# Patient Record
Sex: FEMALE | Race: WHITE
[De-identification: names, ages, dates, MRNs, and addresses within clinical notes are randomized per-mention and may not be internally consistent; named-entity substitution may affect disease eponyms.]

---

## 2019-02-27 ENCOUNTER — HOSPITAL ENCOUNTER (EMERGENCY)
Dept: HOSPITAL 94 - ER | Age: 74
Discharge: HOME | End: 2019-02-27
Payer: MEDICARE

## 2019-02-27 VITALS — WEIGHT: 200.42 LBS | HEIGHT: 68 IN | BODY MASS INDEX: 30.38 KG/M2

## 2019-02-27 VITALS — DIASTOLIC BLOOD PRESSURE: 88 MMHG | SYSTOLIC BLOOD PRESSURE: 135 MMHG

## 2019-02-27 DIAGNOSIS — L03.311: Primary | ICD-10-CM

## 2019-02-27 DIAGNOSIS — Z88.8: ICD-10-CM

## 2019-02-27 DIAGNOSIS — G89.29: ICD-10-CM

## 2019-02-27 DIAGNOSIS — J44.9: ICD-10-CM

## 2019-02-27 DIAGNOSIS — M54.9: ICD-10-CM

## 2019-02-27 DIAGNOSIS — B35.4: ICD-10-CM

## 2019-02-27 DIAGNOSIS — F17.200: ICD-10-CM

## 2019-02-27 DIAGNOSIS — Z88.1: ICD-10-CM

## 2019-02-27 DIAGNOSIS — D72.829: ICD-10-CM

## 2019-02-27 LAB
ALBUMIN SERPL BCP-MCNC: 3.4 G/DL (ref 3.4–5)
ALBUMIN/GLOB SERPL: 0.8 {RATIO} (ref 1.1–1.5)
ALP SERPL-CCNC: 105 IU/L (ref 46–116)
ALT SERPL W P-5'-P-CCNC: 28 U/L (ref 12–78)
AMORPH URATE CRY #/AREA URNS HPF: (no result) /[HPF]
ANION GAP SERPL CALCULATED.3IONS-SCNC: 8 MMOL/L (ref 8–16)
AST SERPL W P-5'-P-CCNC: 24 U/L (ref 10–37)
BACTERIA URNS QL MICRO: (no result) /HPF
BASOPHILS # BLD AUTO: 0.1 X10'3 (ref 0–0.2)
BASOPHILS NFR BLD AUTO: 0.4 % (ref 0–1)
BILIRUB SERPL-MCNC: 0.6 MG/DL (ref 0.1–1)
BUN SERPL-MCNC: 20 MG/DL (ref 7–18)
BUN/CREAT SERPL: 20.8 (ref 6.6–38)
CALCIUM SERPL-MCNC: 9 MG/DL (ref 8.5–10.1)
CHLORIDE SERPL-SCNC: 100 MMOL/L (ref 99–107)
CLARITY UR: (no result)
CO2 SERPL-SCNC: 25.7 MMOL/L (ref 24–32)
COLOR UR: YELLOW
CREAT SERPL-MCNC: 0.96 MG/DL (ref 0.4–0.9)
DEPRECATED SQUAMOUS URNS QL MICRO: (no result) /LPF
EOSINOPHIL # BLD AUTO: 0 X10'3 (ref 0–0.9)
EOSINOPHIL NFR BLD AUTO: 0.1 % (ref 0–6)
ERYTHROCYTE [DISTWIDTH] IN BLOOD BY AUTOMATED COUNT: 13.3 % (ref 11.5–14.5)
GFR SERPL CREATININE-BSD FRML MDRD: 57 ML/MIN
GLUCOSE SERPL-MCNC: 107 MG/DL (ref 70–104)
GLUCOSE UR STRIP-MCNC: NEGATIVE MG/DL
HCT VFR BLD AUTO: 39 % (ref 35–45)
HGB BLD-MCNC: 12.9 G/DL (ref 12–16)
HGB UR QL STRIP: NEGATIVE
HYALINE CASTS URNS QL MICRO: (no result) /LPF
INR PPP: 1 INR
KETONES UR STRIP-MCNC: NEGATIVE MG/DL
LEUKOCYTE ESTERASE UR QL STRIP: NEGATIVE
LYMPHOCYTES # BLD AUTO: 0.8 X10'3 (ref 1.1–4.8)
LYMPHOCYTES NFR BLD AUTO: 3.8 % (ref 21–51)
LYMPHOCYTES NFR BLD MANUAL: 6 % (ref 21–51)
MCH RBC QN AUTO: 29.9 PG (ref 27–31)
MCHC RBC AUTO-ENTMCNC: 33.1 G/DL (ref 33–36.5)
MCV RBC AUTO: 90.4 FL (ref 78–98)
MONOCYTES # BLD AUTO: 1.7 X10'3 (ref 0–0.9)
MONOCYTES NFR BLD AUTO: 8.1 % (ref 2–12)
MONOCYTES NFR BLD MANUAL: 8 % (ref 2–12)
MUCOUS THREADS URNS QL MICRO: (no result) /LPF
NEUTROPHILS # BLD AUTO: 18.2 X10'3 (ref 1.8–7.7)
NEUTROPHILS NFR BLD AUTO: 87.6 % (ref 42–75)
NEUTS BAND # BLD MANUAL: 3 % (ref 0–10)
NEUTS SEG NFR BLD MANUAL: 83 % (ref 42–75)
NITRITE UR QL STRIP: NEGATIVE
PH UR STRIP: 5.5 [PH] (ref 4.8–8)
PLATELET # BLD AUTO: 233 X10'3 (ref 140–440)
PLATELET BLD QL SMEAR: NORMAL
PMV BLD AUTO: 8.1 FL (ref 7.4–10.4)
POTASSIUM SERPL-SCNC: 4 MMOL/L (ref 3.5–5.1)
PROT SERPL-MCNC: 7.6 G/DL (ref 6.4–8.2)
PROT UR QL STRIP: 30 MG/DL
PROTHROMBIN TIME: 9.7 SECONDS (ref 9–12)
RBC # BLD AUTO: 4.32 X10'6 (ref 4.2–5.6)
RBC #/AREA URNS HPF: (no result) /HPF (ref 0–2)
RBC MORPH BLD: NORMAL
SODIUM SERPL-SCNC: 134 MMOL/L (ref 135–145)
SP GR UR STRIP: 1.02 (ref 1–1.03)
TOTAL CELLS COUNTED FLD: 100
TRANS CELLS URNS QL MICRO: (no result) /HPF
URN COLLECT METHOD CLASS: (no result)
UROBILINOGEN UR STRIP-MCNC: 0.2 E.U/DL (ref 0.2–1)
WBC # BLD AUTO: 20.7 X10'3 (ref 4.5–11)
WBC #/AREA URNS HPF: (no result) /HPF (ref 0–4)

## 2019-02-27 PROCEDURE — 85610 PROTHROMBIN TIME: CPT

## 2019-02-27 PROCEDURE — 80053 COMPREHEN METABOLIC PANEL: CPT

## 2019-02-27 PROCEDURE — 85025 COMPLETE CBC W/AUTO DIFF WBC: CPT

## 2019-02-27 PROCEDURE — 83605 ASSAY OF LACTIC ACID: CPT

## 2019-02-27 PROCEDURE — 87040 BLOOD CULTURE FOR BACTERIA: CPT

## 2019-02-27 PROCEDURE — 71046 X-RAY EXAM CHEST 2 VIEWS: CPT

## 2019-02-27 PROCEDURE — 99284 EMERGENCY DEPT VISIT MOD MDM: CPT

## 2019-02-27 PROCEDURE — 93005 ELECTROCARDIOGRAM TRACING: CPT

## 2019-02-27 PROCEDURE — 81001 URINALYSIS AUTO W/SCOPE: CPT

## 2019-02-27 PROCEDURE — 36415 COLL VENOUS BLD VENIPUNCTURE: CPT

## 2019-02-27 PROCEDURE — 84145 PROCALCITONIN (PCT): CPT

## 2019-02-27 PROCEDURE — 96365 THER/PROPH/DIAG IV INF INIT: CPT

## 2019-02-27 NOTE — NUR
Patient was given Tylenol and was able to swallow x2 pills without difficulty. 
Patient then given ibuprofen at which she stated that she could take the x3 200 
mg tablets, patient was given tablets and water. Patient began to cough and 
spit out water. Stated, "Just went down the wrong tube." Patient up at 90 
degrees/ high fowlers. No respirator distress after administration. Patient 
oxygen staturation at 95%, no change since arrival in ED.

## 2019-04-15 NOTE — NUR
Patient's left neck IV infiltrated. Antibiotic and NS stopped. Patient denies 
any pain. swelling noted. RN Des attempting another IV with use of ultra 
sound. Patient ambulated to bedside comode, urine sample collected and sent to 
lab.

## 2019-04-16 NOTE — NUR
Patient in room . I have received report from JC rn   and had the opportunity to 
ask questions and assume patient care.

## 2019-04-16 NOTE — NUR
Patient in room . I have received report from JUSTINA May   and had the opportunity to 
ask questions and assume patient care.

## 2019-04-16 NOTE — NUR
PATIENT SETTLING INTO ROOM nORCO 5MG GIVEN WITH EFFECT FOR PAIN IN LEFT LEG. SLEEPING AND 
RESTING MOST OF SHIFT. UP TO BEDSIDE COMMODE WITH ASSIST X1. REPORT GIVEN TO ROOSEVELT HORN

## 2019-04-16 NOTE — NUR
Pt asleep. She had told me earlier not to awaken her if she did. So the zosyn 
was not scanned r/t her request and her left arm was under the covers. I 
quietly hung the zosyn and started it, without her waking up.

## 2019-04-17 NOTE — NUR
Patient in room . I have received report from   ravi brandt and had the opportunity 
to ask questions and assume patient care.

## 2019-04-17 NOTE — NUR
Problems reprioritized. Patient report given, questions answered & plan of care reviewed 
with JUSTINA May.

## 2019-04-17 NOTE — NUR
patient very upset at times with  having to be "constantly poked" Time spent with patient. 
patient refused to have second set of blood cultures taken. states she will wait till Te 
comes on at 2000hrs. Wick placed for incontinence. patient up using BSCX2. reluctant however 
to work with PT or get out of bed as states leg is very painful. Norco administered for 
pain. Report given to Cheryl BUTLER RN

## 2019-04-17 NOTE — NUR
Patient in room . I have received report from JUSTINA May   and had the opportunity to 
ask questions and assume patient care.

-------------------------------------------------------------------------------

Addendum: 04/17/19 at 1853 by Erica Moreno RN

-------------------------------------------------------------------------------

Amended: Links added.

## 2019-04-18 NOTE — NUR
Problems reprioritized. Patient report given, questions answered & plan of care reviewed 
with JUSTINA Choi. 

-------------------------------------------------------------------------------

Addendum: 04/18/19 at 0637 by Erica Moreno RN

-------------------------------------------------------------------------------

Amended: Links added.

## 2019-04-18 NOTE — NUR
Problems reprioritized. Patient report given, questions answered & plan of care reviewed 
with sherry osorio rn.

## 2019-04-18 NOTE — NUR
patient a/o, has lt. leg cellulitis, taking norco for her pain, no noted open areas and 
needs attended

-------------------------------------------------------------------------------

Addendum: 04/18/19 at 0549 by Erica Moreno RN

-------------------------------------------------------------------------------

Amended: Links added.

## 2019-04-18 NOTE — NUR
Extended PIV inserted to right upper arm basilic vein x 1 attempt after 1 failed attempt to 
the right upper arm cephalic vein both using ultrasound.  Ishmael well.

-------------------------------------------------------------------------------

Addendum: 04/18/19 at 1620 by Aurora Emerson RN

-------------------------------------------------------------------------------

Amended: Links added.

## 2019-04-19 NOTE — NUR
Problems reprioritized. Patient report given, questions answered & plan of care reviewed 
with JUSTINA Choi. 

-------------------------------------------------------------------------------

Addendum: 04/19/19 at 0631 by Erica Moreno RN

-------------------------------------------------------------------------------

Amended: Links added.

## 2019-04-19 NOTE — NUR
Patient in room . I have received report from JUSTINA Choi   and had the opportunity to 
ask questions and assume patient care.

-------------------------------------------------------------------------------

Addendum: 04/19/19 at 1819 by Erica Moreno RN

-------------------------------------------------------------------------------

Amended: Links added.

## 2019-04-19 NOTE — NUR
Initial: Pt admit with LLE cellulitis with possible sepsis. Pt currently 

on a regular diet with documented PO intake 100% meeting nutrient needs. 

[facility] 4/15. Per physical assessment pt is agitated and resistive to care. 

Will continue to follow and make recommendations as appropriate.

Recommendations:

1) Continue with regular diet

2) Monitor need for ONS or additional protein

3) Monitor need for additional bowel care

4) Wt per rx

-------------------------------------------------------------------------------

Addendum: 04/19/19 at 1229 by Esperanza Martinez RD

-------------------------------------------------------------------------------

Amended: Links added.

## 2019-04-20 NOTE — NUR
Patient in room . I have received report from  Lala HORN  and had the opportunity to 
ask questions and assume patient care.

## 2019-04-20 NOTE — NUR
pt complaining that her pain medications always given late, explained to her that her pain 
meds are not scheduled and she needs to call or ask for it if she needs it. pt still upset 
because she fell asleep last night and did not get her pain med after 4 hours because she 
was asleep

-------------------------------------------------------------------------------

Addendum: 04/20/19 at 0546 by Erica Moreno RN

-------------------------------------------------------------------------------

Amended: Links added.

## 2019-04-20 NOTE — NUR
Problems reprioritized. Patient report given, questions answered & plan of care reviewed 
with JUSTINA Schroeder. 

-------------------------------------------------------------------------------

Addendum: 04/20/19 at 0634 by Erica Moreno RN

-------------------------------------------------------------------------------

Amended: Links added.

## 2019-04-20 NOTE — NUR
Problems reprioritized. Patient report given, questions answered & plan of care reviewed 
with SHIRA HORN. PT SLEEPING COMFORTABLY, EVEN RISE AND FALL OF CHEST

## 2019-04-21 NOTE — NUR
Problems reprioritized. Patient report given, questions answered & plan of care reviewed 
with JUSTINA Boss.

## 2019-04-21 NOTE — NUR
Patient in room . I have received report from JUSTINA Blankenship   and had the opportunity 
to ask questions and assume patient care.

-------------------------------------------------------------------------------

Addendum: 04/21/19 at 1915 by Karyn Oconnor RN

-------------------------------------------------------------------------------

Amended: Links added.

## 2019-04-21 NOTE — NUR
Pt agreeable to taking prune juice. 1 cup of prune juice provided and consumed by pt. Will 
continue to monitor.

## 2019-04-21 NOTE — NUR
Patient in room . I have received report from  JUSTINA Chaudhary  and had the opportunity to 
ask questions and assume patient care.

## 2019-04-21 NOTE — NUR
No BM since PTA. Pt states "I refuse to take a laxative. You guys take as long as you do to 
get down here, and I'm not shitting the bed". Will notify MD and continue to monitor.

## 2019-04-21 NOTE — NUR
Problems reprioritized. Patient report given, questions answered & plan of care reviewed 
with Pattie HORN.

## 2019-04-22 NOTE — NUR
Problems reprioritized. Patient report given, questions answered & plan of care reviewed 
with JUSTINA Mejía. 

-------------------------------------------------------------------------------

Addendum: 04/22/19 at 0623 by Karyn Oconnor RN

-------------------------------------------------------------------------------

Amended: Links added.

## 2019-04-23 NOTE — NUR
Problems reprioritized. Patient report given, questions answered & plan of care reviewed 
with JUSTINA Rogers. 

-------------------------------------------------------------------------------

Addendum: 04/23/19 at 0619 by Karyn Oconnro RN

-------------------------------------------------------------------------------

Amended: Links added.

## 2019-04-23 NOTE — NUR
Patient in room . I have received report from JUSTINA Boss and had the opportunity to 
ask questions and assume patient care.

## 2019-04-23 NOTE — NUR
Reassessment: Pt continues with abx tx for cellulitis. Per MD notes pt  blood cultures 
positive for Staph Epi. Pt remains on regular diet with documented PO intake % still 
meeting nutrient needs. [facility] 4/22. Will continue to follow.

Recommendations:

1) Continue with regular diet

2) Monitor need for ONS or additional protein

3) Monitor need for additional bowel care

4) Wt per rx

-------------------------------------------------------------------------------

Addendum: 04/23/19 at 1110 by Esperanza Martinez RD

-------------------------------------------------------------------------------

Amended: Links added.

## 2019-04-23 NOTE — NUR
Problems reprioritized. Patient report given, questions answered & plan of care reviewed 
with Cheryl TONY RN.

## 2019-04-24 NOTE — NUR
EXTENDED IV DC, CANULA INTACT. ALL BELONGINGS IN HAND INCLUDING RX FOR NORCO DELIVERED BY 
[facility] BEDSIDE. FOLLOW UP INSTRUCTIONS GIVEN, ALL QUESTIONS ANSWERED.

## 2019-04-24 NOTE — NUR
Problems reprioritized. Patient report given, questions answered & plan of care reviewed 
with JUSTINA Browning.

## 2019-04-24 NOTE — NUR
PT DISCHARGED IN STABLE CONDITION. PT WAS WAITING FOR CAB RIDE, I WANTED TO LEAVE PT AT 
 IN LOBBY TO WAIT, SHE INSISTED THAT SHE WAIT OUTSIDE. I SUGGESTED SHE JUST WAIT 
INSIDE BECAUSE IT WAS WARM OUTSIDE. SHE STATED THAT SHE WAS SICK OF US MICROMANAGING HER, 
AND SHE INSISTED THAT SHE WAIT OUTSIDE. LEFT PT ON BENCH IN FRONT OF HOSPITAL TO WAIT FOR 
CAB.

## 2019-09-05 NOTE — NUR
Admission note:

   Pt admitted at 1220 from [facility]  on 5150 for DTS. Pt has a plan to break a bottle to 
cut her wrists or overdose on pills. [facility] 
out. PT feels depressed, hopeless, helpless and suicidal. Pt unable to formulate a  safety 
plan at this time. Pt has history of arthritis,, smoker, depression. Pt states her social 
security recently stopped and  has been trying to fix it. She reports prior suicide 
attempts. Pt states "Im at the end of anything. Month after month, day after day. Im tired 
of thinking about new ways to kill myself everyday. I have nowhere to go and nothing to do 
and cant live any longer."

## 2019-09-05 NOTE — NUR
Nursing Progress Note:



Legal hold:



Client on 5150 for DTS



Why are they here: Pt is here because she has no will to live. She has been at the [facility] 
and got kicked out for 30 days and now she wants to die.







Assessment



What has happened this shift:

After admission, pt ate lunch and attended afternoon group, then returned to her room and 
slept.



S/I, H/I: Yes, S.I.

A/VH: denies

Sleep: nap

ADL's: encourage, minimal assistance

Group attendance: yes

Were meds taken: n/a

Any med S/E





Mental Status Exam



Appearance: Disheveled

Eye contact:good

Behavior:cooperative, negative

Speech: clear

Mood: depressed, apathetic

Affect:expressive

Thought process: intact

Thought Content: perseverating on her life not worth living

Cognition:intact

Insight:poor

Judgment:poor







Interventions



PRN's used: n/a

Therapeutic interventions: positive reinforcement, milieu push, 1:1 

Restraints/seclusion/emergency medication: n/a





Justification of Continued Inpatient Treatment: pt remains DTS

## 2019-09-05 NOTE — NUR
Pt admitted from [facility] on a 5150 for DTS at 1220. Pt has been homeless and staying 
at the [place]. Pt came in stating, "I don't know why you guys just don't euthanize me. Why 
are you wasting so much money on a disabled, old person like me." Pt is hopeless and 
helpless. She is very expressive about this. "What's the point?" 2 Person skin check 
complete and all belongings logged in. Pt showered with staff assistance and clothes were 
washed. Pt oriented to the unit.

## 2019-09-06 NOTE — NUR
Nursing Progress Note:



Legal hold:  Client on 5150 for DTS



Why are they here: Pt is here because she has no will to live. She has been at the [place] 
and got kicked out for 30 days and now she wants to die.







Assessment



What has happened this shift:  Patient isolated to her room the entirety of the shift. She 
sat up in bed reading. Pt states she is not feeling suicidal right now but she is very upset 
that her MRSA culture came back positive. She states, "now where will take me? no where will 
want someone MRSA positive." Writer educates pt on what MRSA is and hand washing regimen. PT 
calms down after this and states that she will make sure that if she gets any cuts or 
scratches that she will be sure to wash them with soap and water and keep them clean. Pt 
refuses her HS amlodipine because "I have never taken that and I need to speak with a doctor 
face to face before I do please." Pt requests hydroxyzine 25mg PRN, which was given to her.  




S/I, H/I: "not right now" 

A/VH: denies

Sleep: see sleep assessment notation .

ADL's: encourage, minimal assistance

Group attendance: night shift, no group 

Were meds taken: n/a

Any med S/E: none reported, none observed 





Mental Status Exam



Appearance: Disheveled

Eye contact: good

Behavior: cooperative, negative

Speech: clear, loud.

Mood: depressed, apathetic

Affect: expressive

Thought process: Linear. Poverty of thought.

Thought Content: preoccupied about MRSA 

Cognition: intact

Insight: poor

Judgment: poor







Interventions



PRN's used: hydroxyzine 

Therapeutic interventions: 1:1, therapeutic conversation, clear and simple instructions, 
positive reinforcement, q15 safety checks.

Restraints/seclusion/emergency medication: n/a





Justification of Continued Inpatient Treatment: Pt. Is gravely disabled and cannot provide 
food, clothing and shelter. Patient is in need of crisis stabilization.

## 2019-09-06 NOTE — NUR
[facility] COLLATER[facility]:



UZAIR made TC to [facility] Charmaine (Director) [phone], who reports pt cannot return to [facility] 
because she refused to follow medication guidelines. [facility] reports he will look into the 
storage of pt items and return contact to SW once he knows if items are still in storage.



Erica Prakash,  [facility]

[id number]

Supervised by Rome Merino, [id number]

-------------------------------------------------------------------------------

Addendum: 09/06/19 at 2028 by Erica Prakash SS

-------------------------------------------------------------------------------

Addition:



UZAIR met w/  staff who provided SW w/ pt's stored items. UZAIR placed items in pt 
storage area w/ name tag attached to bag.

## 2019-09-06 NOTE — NUR
Nursing Progress Note:



Legal hold:



Client on 5150 for DTS



Why are they here: Pt is here because she has no will to live. She has been at the [place] 
and got kicked out for 30 days and now she wants to die.







Assessment



What has happened this shift:



Patient laying in bed at the beginning of shift. Patient was moved into a new bedroom and 
appears to be adjusting well. She is considerate of roommate and initiates conversation. 
Patient states she is feeling better but endorses depression. She denies SI, HI, AH, and VH. 
Patient expressed her sadness for being an elderly homeless woman that is not , and 
no children or grandchildren. Patient explains personal timeline out of order, jumping from 
present to past. She consistently emphasizes ETOH abuse did not help her situation. 



Patient explained a situation where a previous county "dumped" her and later explained how 
her and a friend got onto a bus because he knew family they could stay with. Patient also 
endorsed previous amphetamine use in which she went on a tangent that some may make it out 
ok but it "eats up" most people and continued to explain an invisible realm you can only see 
when using. 



Patient remained in her room most of the shift, reading a book. Pleasant and cooperative 
with assessment and able to make needs known.  



S/I, H/I: denies

A/VH: denies

Sleep: asleep at this time

ADL's: encourage, minimal assistance

Group attendance: no groups this shift

Were meds taken: none scheduled but PRN taken

Any med S/E: none observed, none reported.





Mental Status Exam



Appearance: Disheveled

Eye contact: good

Behavior: cooperative

Speech: clear

Mood: depressed, apathetic

Affect:expressive

Thought process: tangential, delusional

Thought Content: perseverating on being homeless with no spouse, children, or grandchildren

Cognition:intact

Insight:poor

Judgment:poor







Interventions



PRN's used: Motrin effective



Therapeutic interventions: positive reinforcement, milieu push, 1:1 

Restraints/seclusion/emergency medication: n/a





Justification of Continued Inpatient Treatment: pt remains DTS

## 2019-09-06 NOTE — NUR
Nursing Progress Note:



Legal hold:  Client on 5150 for DTS



Why are they here: Pt is here because she has no will to live. She has been at the [place] 
and got kicked out for 30 days and now she wants to die.







Assessment



What has happened this shift:  Patient sleeping most of the day.  Up for all meals. C/O 
aching pain all over, especially arms. Takes Tylenol for pain..  Patient perseverates on 
negatives. Educated client on MRSA and need for good hand washing. Pt. Upset that she cannot 
use baby wipes or alcohol on hands, and soap cracks her skin.



S/I, H/I: What, are you going to kick me out if I say no. Patient was informed that she has 
no housing and she will not be kicked out.

A/VH: denies

Sleep: 7.5 hrs. NOC, napped during daytime.

ADL's: encourage, minimal assistance

Group attendance: yes

Were meds taken: n/a

Any med S/E





Mental Status Exam



Appearance: Disheveled

Eye contact: good

Behavior: cooperative, negative

Speech: clear, loud.

Mood: depressed, apathetic

Affect: expressive

Thought process: Linear. Poverty of thought.

Thought Content: Wanting general assistance paperwork filled out.

Cognition: intact

Insight: poor

Judgment: poor







Interventions



PRN's used: n/a

Therapeutic interventions: 1:1, therapeutic conversation, clear and simple instructions, 
positive reinforcement, q15 safety checks.

Restraints/seclusion/emergency medication: n/a





Justification of Continued Inpatient Treatment: Pt. Is gravely disabled and cannot provide 
food, clothing and shelter. Patient is in need of crisis stabilization.

## 2019-09-07 NOTE — NUR
Nursing Progress Note:



Legal hold:  Client on 5150 for DTS





Why are they here: Pt is here because she has no will to live. She has been at the [place] 
and got kicked out for 30 days and now she wants to die.



What happened this shift?: Patient awakened for breakfast and medications.  Pt. initially 
refused BP medication and insisted on seeing the  before she would take her 
med.  Knocked on SW door and no one was there, informed her that I couldn't find SW yet. She 
agreed to take Amlodipine.  Pt. napped in afternoon.  





Assessment



S/I, H/I: Denies.

A/VH: denies

Sleep: Slept well NOC, napped during daytime.

ADL's: encourage, minimal assistance

Group attendance: yes

Were meds taken: Yes

Any med S/E





Mental Status Exam



Appearance: Disheveled

Eye contact: good

Behavior: cooperative, negative

Speech: clear, loud.

Mood: depressed, apathetic

Affect: expressive

Thought process: Linear. Poverty of thought.

Thought Content: Wanting general assistance paperwork filled out.

Cognition: intact

Insight: poor

Judgment: poor







Interventions



PRN's used: Tylenol



Therapeutic interventions: 1:1, therapeutic conversation, clear and simple instructions, 
positive reinforcement, q15 safety checks.



Restraints/seclusion/emergency medication: n/a





Justification of Continued Inpatient Treatment: Pt. Is gravely disabled and cannot provide 
food, clothing and shelter. Patient is in need of crisis stabilization.

## 2019-09-07 NOTE — NUR
Refused med:

  Pts /92 . Encouraged pt to take her new order of Norvasc. Pt refusing stating "I 
dont want to go to sleep. I need to stay awake for the ." After multiple 
attempts, pt continues to refuse it.

## 2019-09-08 NOTE — NUR
Nursing Progress Note:



Legal hold:  Client on 5150 for DTS



Why are they here: Pt is here because she has no will to live. She has been at the [facility] 
and got kicked out for 30 days and now she wants to die.







Assessment



What has happened this shift:  Patient isolated to her room at the beginning of the shift. 
She requests L depends which were given to her. She reports she has only been given XL 
depends which is causing leakage and she has to change her pants often. After she changed 
into the L size she reports it is much more comfortable. She goes into the group room for 
snack and stays in the room to eat and watch TV. Pt is talkative and tells stories to 
writer. She is cooperative with 1:1 assessment.  Pt requests hydroxyzine 25mg PRN and 
tylenol for arthritis pain, which was given to her.  



S/I, H/I: "not right now" 

A/VH: denies

Sleep: see sleep assessment notation .

ADL's: encourage, minimal assistance

Group attendance: night shift, no group 

Were meds taken: yes

Any med S/E: none reported, none observed 





Mental Status Exam



Appearance: Disheveled

Eye contact: good

Behavior: cooperative, negative

Speech: clear, loud.

Mood: fair

Affect: bland 

Thought process: Linear

Thought Content: WNL

Cognition: intact

Insight: poor

Judgment: poor







Interventions



PRN's used: hydroxyzine 

Therapeutic interventions: 1:1, therapeutic conversation, clear and simple instructions, 
positive reinforcement, q15 safety checks.

Restraints/seclusion/emergency medication: n/a





Justification of Continued Inpatient Treatment: Pt. Is gravely disabled and cannot provide 
food, clothing and shelter. Patient is in need of crisis stabilization.

## 2019-09-08 NOTE — NUR
Nursing Progress Note:



Legal hold:  N/A, pt is now on voluntary status



SBAR from Mar Roper RN



Why are they here: Pt is here because she has no will to live. She has been at the [facility] 
and got kicked out for 30 days and now she wants to die.







Assessment



What has happened this shift:  Pt states she is depressed about her life and if she had a 
way to end it, she would. Pt states that there is no point in going on. Pt contracts for 
safety here, states she won't try anything here, "that'd be stupid." Pt believes she has had 
a BM since 9/3/19 (the last one documented in [facility]) but can't remember when. Pt became 
irritable when offered MOM, stated that she is fine, she doesn't go everyday, states that 
she is not uncomfortable or in any pain, states that she will get up and exercise later and 
that should help. Pt c/o feeling sleepy after breakfast, stated she thinks it's from the 
blood pressure med, amlodipine then states or maybe it's because she ate too much. Pt sleeps 
with stuffed animal (dog) under her arm. Pt has 3+ pitting edema left lower leg from knee 
down. Pt states it has been this way since she had cellulitis 3 months ago, hospitalist 
aware, no redness noted, not warm to touch.



S/I, H/I: Pt denies HI, + SI; no plan.

A/VH: Pt denies

Sleep: Slept 7 hours per noc shift report, napped after breakfast.

ADL's: encourage, minimal assistance

Group attendance: No 

Were meds taken: yes

Any med S/E: reported feeling tired from amlodipine





Mental Status Exam



Appearance: Disheveled

Eye contact: Good

Behavior: Cooperative, stays in room for most of shift, out for meals

Speech: Clear, audible

Mood: Depressed, irritable

Affect: congruent

Thought process: Linear

Thought Content: thinks BP med makes her sleepy, does not wish to be pestered about 
constipation

Cognition: A/O X 4

Insight: poor

Judgment: poor







Interventions



PRN's used: Tylenol 975 mg at 0734 for bilateral shoulder pain



Therapeutic interventions: 1:1 assessment, therapeutic conversation, medication 
administration/education/monitoring, encouragement to perform personal hygiene, 
encouragement to attend groups, Q 15 min safety checks.

Restraints/seclusion/emergency medication: N/A





Justification of Continued Inpatient Treatment: Pt is gravely disabled and cannot provide 
food, clothing and shelter. Patient is in need of crisis stabilization and medication 
adjustment.

## 2019-09-09 NOTE — NUR
Nursing Progress Note:



Legal hold:  N/A, pt is now on voluntary status



SBAR from Tyler HORN



Why are they here: Pt is here because she has no will to live. She has been at the [facility] 
and got kicked out for 30 days and now she wants to die.







Assessment



What has happened this shift:  PT is visible on the unit, sitting in the community room. She 
also is observed reading a book in her room. She is upset that her disability has been 
stopped and says that tomorrow she might be able to talk to someone about it. She says her 
disability never should have been stopped. She reports her shoulders are still in pain and 
she utilizes the PRN tylenol. She also takes her HS amlodipine but refuses her colace. 

Pt's LLE is still +3, but pt reports it is "feeling much better, I am able to move it more 
than I was before, it's not as tight." 



S/I, H/I: Pt denies

A/VH: Pt denies

Sleep: See sleep assessment notations 

ADL's: encourage, minimal assistance

Group attendance: Night shift no group

Were meds taken:  denied colace 

Any med S/E:none reported or observed this shift. 





Mental Status Exam



Appearance: Disheveled

Eye contact: Good

Behavior: Cooperative, visible on unit 

Speech: Clear, audible

Mood: Depressed, worried

Affect:depressed 

Thought process: Linear

Thought Content: thinks BP med makes her sleepy, memory issues 

Cognition: A/O X 4

Insight: poor

Judgment: poor







Interventions



PRN's used: MOM



Therapeutic interventions: 1:1 assessment, therapeutic conversation, medication 
administration/education/monitoring, encouragement to perform personal hygiene, 
encouragement to attend groups, Q 15 min safety checks.

Restraints/seclusion/emergency medication: N/A





Justification of Continued Inpatient Treatment: Pt is gravely disabled and cannot provide 
food, clothing and shelter. Patient is in need of crisis stabilization and medication 
adjustment.

## 2019-09-09 NOTE — NUR
Nursing Progress Note:



Legal hold:  N/A, pt is now on voluntary status



SBAR from Tyler HORN



Why are they here: Pt is here because she has no will to live. She has been at the [facility] 
and got kicked out for 30 days and now she wants to die.







Assessment



What has happened this shift:  PT is visible on the unit, sitting in the community room. She 
eats snack with her peers. She expresses that she has been feeling like her short term 
memory is getting worse. "I don't remember the conversation I had just 2 to 3 hours ago." 
"My long term memory is still there, I know physically I am getting older but mentally I 
don't want to accept that." Pt is very concerned about medications and worried that they 
could effect her memory. 

Pt states she is still constipated and requests MOM which was given to her. Pt has 3+ 
pitting edema left lower leg from knee down. Pt states it has been this way since she had 
cellulitis 3 months ago, hospitalist aware, no redness noted, not warm to touch.



S/I, H/I: Pt denies

A/VH: Pt denies

Sleep: See sleep assessment notations 

ADL's: encourage, minimal assistance

Group attendance: Night shift no group

Were meds taken: MOM, denied colace 

Any med S/E: reported feeling tired from amlodipine





Mental Status Exam



Appearance: Disheveled

Eye contact: Good

Behavior: Cooperative, visible on unit 

Speech: Clear, audible

Mood: Depressed, worried

Affect:depressed 

Thought process: Linear

Thought Content: thinks BP med makes her sleepy, memory issues 

Cognition: A/O X 4

Insight: poor

Judgment: poor







Interventions



PRN's used: MOM



Therapeutic interventions: 1:1 assessment, therapeutic conversation, medication 
administration/education/monitoring, encouragement to perform personal hygiene, 
encouragement to attend groups, Q 15 min safety checks.

Restraints/seclusion/emergency medication: N/A





Justification of Continued Inpatient Treatment: Pt is gravely disabled and cannot provide 
food, clothing and shelter. Patient is in need of crisis stabilization and medication 
adjustment.

## 2019-09-09 NOTE — NUR
Nursing Progress Note:



Legal hold:  N/A, pt is now on voluntary status



SBAR from Mar Roper RN



Why are they here: Pt is here because she has no will to live. She has been at the [facility] 
and got kicked out for 30 days and now she wants to die.







Assessment



What has happened this shift:  Patient was asleep at change of shift and up in the Community 
Room before breakfast.  Patient found reading a book in the community room when RN did the 
1:1.  Patient had refused her Colace this morning because she had MOM last night and was 
afraid of a "blowout".  Patient upset that her Disability was cut off in July.  Patient 
states she has no friends or family she can live with.  Patient was thrown out of the 
[facility] for 30 days.  Patient  states she has lived in [place] for 2 years.  When JUSTINA Bang 
asked her about depression and Suicidal ideation.  Patient states "yes I'm depressed and I 
have been suicidal for 40 years.  I have attempted to kill myself twice and I might try it 
again."  Patient then goes back to being upset at Social Security for cancelling her 
disability.  Pt said that SS told her that she needs to go to [place] and see their 
doctor to have her disability reinstated.  Patient states her disability is not due to a 
physical ailment.  Then patient moves on to another subject and states her constipation is 
due to the medication she has received here and she is going to refuse her medication 
tonight. The doctor should know that "he is causing my constipation due to the medication 
her prescribes."  RN asked patient about the edema to her left lower leg.  Patient states 
she has had edema to her left lower leg for a long time.



S/I, H/I: Pt denies HI, + SI; no plan.

A/VH: Pt denies

Sleep: Short nap during the day

ADL's: encourage, minimal assistance

Group attendance: No 

Were meds taken: yes

Any med S/E: "constipation"





Mental Status Exam



Appearance: Disheveled

Eye contact: Good

Behavior: Cooperative but agitated

Speech: Clear, audible

Mood: Depressed, irritable

Affect: somewhat labile

Thought process: Linear

Thought Content: Perseverating on losing her Disability

Cognition: A/O X 4

Insight: poor

Judgment: poor







Interventions



PRN's used: None



Therapeutic interventions: 1:1 assessment, therapeutic conversation, medication 
administration/education/monitoring, encouragement to perform personal hygiene, 
encouragement to attend groups, Q 15 min safety checks.

Restraints/seclusion/emergency medication: N/A





Justification of Continued Inpatient Treatment: Pt is gravely disabled and cannot provide 
food, clothing and shelter. Patient is in need of crisis stabilization and medication 
adjustment.

## 2019-09-10 NOTE — NUR
REFERRAL COORDINATION:



SW met w/ pt and discussed housing and advocate resources. Pt agreeable to [facility] referral, 
[facility] and [facility]. Pt completed ALONSO for communication w/ [facility] 
, [facility] and [facility].



Erica Prakash,  CSW

[id number]

Supervised by Rome Merino, [id number]

## 2019-09-10 NOTE — NUR
Good appetite, eating well, % average PO Intake of regular meals. 

Documented with constipation, no BM for one week. Patient is refusing 

colace for four days, did take milk of magnesia on 9/8. Bowel care will 

continue to be offered by nursing. 



Recommend: 

1. continue regular diet

2. continue bowel care

3. Weight per rx

-------------------------------------------------------------------------------

Addendum: 09/10/19 at 0940 by Nhung Giordano RD

-------------------------------------------------------------------------------

Amended: Links added.

## 2019-09-10 NOTE — NUR
Nursing Progress Note:



Legal hold:  N/A, pt is now on voluntary status



SBAR from Mar Roper RN



Why are they here: Pt is here because she has no will to live. She has been at the [facility] 
and got kicked out for 30 days and now she wants to die.







Assessment



What has happened this shift:  Patient was up at change of shift and up and walking in the 
hallways.  Patient found reading a book in her room when RN did the 1:1.  Patient had 
refused her Colace this morning again because she had a large BM yesterday afternoon.  
Patient upset that her Disability was cut off in July.  Patient c/o having a diuretic effect 
from her medication. Patient wants to speak to the Doctor about this.  Patient is still 
depressed and feels hopeless and helpless.  Patient feels like she wants to die.  Patient is 
elderly, uses a walker with no place to live. In the afternoon Agustin was in the room with 
patient talking to her about taking an anti-depressant.  Patient finally agreed.  RN gave 
patient a PPD test for placement. 



S/I, H/I: Pt denies HI, + SI; no plan.

A/VH: Pt denies

Sleep: Short naps during the day

ADL's: encourage, minimal assistance

Group attendance: No 

Were meds taken: yes

Any med S/E: None observed





Mental Status Exam



Appearance: Disheveled

Eye contact: Good

Behavior: Cooperative 

Speech: Clear, audible

Mood: Depressed

Affect:  Depressed

Thought process: Linear

Thought Content: Patient has no future.

Cognition: A/O X 4

Insight: poor

Judgment: poor







Interventions



PRN's used: Tylenol



Therapeutic interventions: 1:1 assessment, therapeutic conversation, medication 
administration/education/monitoring, encouragement to perform personal hygiene, 
encouragement to attend groups, Q 15 min safety checks.

Restraints/seclusion/emergency medication: N/A





Justification of Continued Inpatient Treatment: Pt is gravely disabled and cannot provide 
food, clothing and shelter. Patient is in need of crisis stabilization and medication 
adjustment.

## 2019-09-10 NOTE — NUR
Nursing Progress Note:



Legal hold:  N/A, pt is now on voluntary status



SBAR from Tyler HORN



Why are they here: Pt is here because she has no will to live. She has been at the [facility] 
and got kicked out for 30 days and now she wants to die.







Assessment



What has happened this shift:  PT is observed reading a book quietly in her room at shift 
change. She is polite and cooperative with 1:1 assessment. Pt is feeling anxious regarding 
her discharge because of placement. she is worried she will not be able to find a place that 
will fit her needs. She explains that she was upset on day shift because she was upset with 
a . She also explains that she needs to apply for cash assistance before 
leaving but discovered she needs her birth certificate, which she does not have. Pt said she 
is still feeling suicidal but she thinks it is from stress of not having a place to live or 
any money. 



S/I, H/I: Passive SI

A/VH: Pt denies

Sleep: See sleep assessment notations 

ADL's: encourage, minimal assistance

Group attendance: Night shift no group

Were meds taken: yes 

Any med S/E:none reported or observed this shift. 





Mental Status Exam



Appearance: Disheveled

Eye contact: Good

Behavior: Cooperative, visible on unit 

Speech: Clear, audible

Mood: Depressed, worried

Affect:depressed 

Thought process: Linear

Thought Content: worried about disharge and money 

Cognition: A/O X 4

Insight: poor

Judgment: poor







Interventions



PRN's used: Tylenol 



Therapeutic interventions: 1:1 assessment, therapeutic conversation, medication 
administration/education/monitoring, encouragement to perform personal hygiene, 
encouragement to attend groups, Q 15 min safety checks.

Restraints/seclusion/emergency medication: N/A





Justification of Continued Inpatient Treatment: Pt is gravely disabled and cannot provide 
food, clothing and shelter. Patient is in need of crisis stabilization and medication 
adjustment.

## 2019-09-11 NOTE — NUR
Nursing Progress Note:



Legal hold:  N/A, pt is now on voluntary status



SBAR from Diane HORN



Why are they here: Pt is here because she has no will to live. She has been at the [facility] 
and got kicked out for 30 days and now she wants to die.



Assessment



What has happened this shift: Received patient up at change of and in group room. She 
appears ornery but is very pleasant and concerned about her pain management issues. Received 
Tylenol PRN in AM and took AM colace as well. Patient found reading a book in her room when 
RN did the 1:1. Continues to be upset that her Disability was cut off in July.  Patient c/o 
having a diuretic effect from her medication. Patient wants to speak to the Doctor about 
this.  Patient is still depressed and feels hopeless and helpless. Patient is elderly, uses 
a walker with no place to live.Interacts well with others and spent time in group room 
watching TV.



S/I, H/I: Pt denies HI, + SI; no plan.

A/VH: Pt denies

Sleep: Short naps during the day

ADL's: encourage, minimal assistance

Group attendance: No 

Were meds taken: yes

Any med S/E: None observed



Mental Status Exam



Appearance: Disheveled

Eye contact: Good

Behavior: Cooperative 

Speech: Clear, audible

Mood: Depressed

Affect:  Depressed

Thought process: Linear

Thought Content: Patient has no future.

Cognition: A/O X 4

Insight: poor

Judgment: poor



Interventions



PRN's used: Tylenol



Therapeutic interventions: 1:1 assessment, therapeutic conversation, medication 
administration/education/monitoring, encouragement to perform personal hygiene, 
encouragement to attend groups, Q 15 min safety checks.

Restraints/seclusion/emergency medication: N/A



Justification of Continued Inpatient Treatment: Pt is gravely disabled and cannot provide 
food, clothing and shelter. Patient is in need of crisis stabilization and medication 
adjustment.

## 2019-09-11 NOTE — NUR
CONTACT:



SW received return contact from Eneanderson Israel at [phone], regarding pt SSI being 
discontinued. Ene reports pt is placed on "N20 Status" for failing to return completed 
form that is required. She instructed pt to complete [id number] Appointment form to allow her 
to assist pt in regaining her entitlements. SW will fax form to [phone].



Erica Prakash,  [facility]

[id number]

Supervised by Rome Merino, [id number]

-------------------------------------------------------------------------------

Addendum: 09/11/19 at 1425 by Erica Prakash SS

-------------------------------------------------------------------------------

Addition:



Pt was interviewed by [facility] , Andrea, who states he is not ready to accept pt 
until she is able to be more flexible and agreeable to following structure of the program. 
He reports he will return to interview her on Monday, 09/16/2019.

End Note.

## 2019-09-12 NOTE — NUR
Nursing Progress Note:



Legal hold:  N/A, pt is now on voluntary status



SBAR from Diane HORN



Why are they here: Pt is here because she has no will to live. She has been at the [facility] 
and got kicked out for 30 days and now she wants to die.



Assessment



What has happened this shift: Received patient up at change of and in group room. She 
continues to present as ornery but can be very pleasant and remains concerned about housing 
and pain issues. Received Tylenol PRN in AM but refused colace, stating she was afraid it 
would give her diarhea. She stated that if she does not have a bowel movement today, she 
will ask for MOM gavi. Pt talks about not wanting to live and feels hopeless. Vague SI 
stated with negative attitude and view of life. Patient reads in her room, sits in hallway 
and group room as well.Patient c/o having a diuretic effect from her medication. Patient 
wants to speak to the Doctor about this.  Patient is still depressed and feels hopeless and 
helpless. She continues to use a walker to ambulate and resists engaging in discharge 
planning and displays poor insight and judgement.



S/I, H/I: Pt denies HI, + SI; no specific plan.

A/VH: Pt denies

Sleep: Short naps during the day

ADL's: encourage, minimal assistance

Group attendance: No 

Were meds taken: yes

Any med S/E: None observed



Mental Status Exam



Appearance: Disheveled

Eye contact: Good

Behavior: Cooperative 

Speech: Clear, audible

Mood: Depressed

Affect:  Depressed

Thought process: Linear

Thought Content: Patient has no future.

Cognition: A/O X 4

Insight: poor

Judgment: poor



Interventions



PRN's used: Tylenol



Therapeutic interventions: 1:1 assessment, therapeutic conversation, medication 
administration/education/monitoring, encouragement to perform personal hygiene, 
encouragement to attend groups, Q 15 min safety checks.

Restraints/seclusion/emergency medication: N/A



Justification of Continued Inpatient Treatment: Pt is gravely disabled and cannot provide 
food, clothing and shelter. Patient is in need of crisis stabilization and medication 
adjustment.

## 2019-09-12 NOTE — NUR
[facility] Contact:



SW made TC to Frances at [facility] ([phone]) and left message requesting a return 
contact. 



Erica Prakash,  [facility]

[id number]

Supervised by Rome Merino, [id number]

-------------------------------------------------------------------------------

Addendum: 09/12/19 at 1228 by Erica Prakash SS

-------------------------------------------------------------------------------

Addition to note:



Pt signed ALONSO for [facility] communication, collaboration and referral.

End note.

## 2019-09-12 NOTE — NUR
Nursing Progress Note:



Legal hold: Voluntary



Client on voluntary DTS



Report received from nurse with use of SBAR: JUSTINA Lee



Why are they here: Pt admitted  from [facility]  on 5150 for DTS. Pt has a plan to break a 
bottle to cut her wrists or overdose on pills. [facility] 
30 day out. She reports feeling depressed, hopeless, and helpless. Pt unable to formulate a  
safety plan at this time. She reports prior suicide attempts. 



Assessment



What has happened this shift: Pt. up sitting in the Group Room at the beginning of the 
shift, when greeted by this writer pt. states, "I had a bad day, I have negative energy, I 
pissed off the doctor, , and [facility] interviewer." This writer provided active 
listening and positive encouragement, and pt. continued to speak with a linear thought 
process, but in an accelerated and loud voice. Pt. continues to present as mildly irritable. 
and will interrupt others. She reports ongoing S/I, depression, and anxiety, states, "I just 
feel tired and hopeless. Pt. refuses to take an anxiolytic at this time because she fears it 
will negatively affect her other medications. She reports she continues to worry that she 
will not receive enough care if she is discharged to [facility]. Pt. has also been considering 
[facility], but she is concerned about the smoking policy and having to 
share a room. Pt. states, "I am just tired of worrying about shelter, food, and having 
medical care." She also voices concern regarding re-establishing her Social Security funds. 

S/I, H/I: Passive S/I, does not report a plan

A/VH: N/A

Sleep: Pt. reports chronic insomnia. She awoke at approximately midnight reporting insomnia 
r/t chronic bilateral shoulder pain. Obtained order for one-time dose of 600mg of Motrin and 
heat packs placed at area, will continue to monitor.

ADL's: Independent, uses FWW for ambulation

Group attendance: Pt. reports she attends groups

Were meds taken: Yes

Any med S/E: None





Mental Status Exam



Appearance: Neat and appropriately dressed

Eye contact: Good

Behavior: Cooperative, fatigued, slightly irritable, and anxious

Speech: WNL, although continues to be accelerated and loud, will interrupt others

Mood: Slightly irritable

Affect: Constricted

Thought process: WNL

Thought Content: Perseveration over feeling depressed, hopeless, and helpless.

Cognition: A&O

Insight: Fair

Judgment: Fair





Interventions



PRN's used: Tylenol and one-time dose of 600mg of Motrin 

Therapeutic interventions: Ensured contract for safety, maintained a safe and supportive 
environment, encouraged independent performance of ADLs, monitored behavior and need for 
intervention, educated to pt. to let staff know if she continues to experience insomnia, and 
maintained Q 15 min safety checks. 

Restraints/seclusion/emergency medication: N/A





Justification of Continued Inpatient Treatment: Pt. continues to require medication 
adjustments and a safe and supportive environment.

## 2019-09-12 NOTE — NUR
Nursing Note: Pt. awoke at approximately midnight reporting insomnia r/t chronic bilateral 
shoulder pain. Obtained order for one-time dose of 600mg of Motrin and heat packs placed at 
area, will continue to monitor. Luis Carlos MUSE also gave order to obtain a UA on pt., education 
provided to pt. and she reports understanding.

## 2019-09-13 NOTE — NUR
Nursing Progress Note: Suni Doshihussein



Legal hold: Voluntary



Client on voluntary DTS



Report received from nurse with use of SBAR: Mar GREENE



Why are they here: Pt admitted  from [facility]  on 5150 for DTS. Pt has a plan to break a 
bottle to cut her wrists or overdose on pills. [facility] but is on a 
30 day out. She reports feeling depressed, hopeless, and helpless. Pt unable to formulate a  
safety plan at this time. She reports prior suicide attempts. 



Assessment



What has happened this shift: 

Patient was in her room resting in bed to begin the shift today. Her respirations appeared 
even, unlabored and she was in no apparent signs of distress. Patient refused am Colace 
stating, " it has worked very well, believe me". Compliant with am assessment and went to 
Group Room for breakfast. Social with Staff as well as peer group. Client keeps asking for 
Motrin and refusing Tylenol for complaints of, "pain". Client visible on unit this 
afternoon. She remains loud and intrusive but redirectable.



S/I, H/I: denies

A/VH: denies

Sleep: 

ADL's: Independent, uses FWW for ambulation

Group attendance: no

Were meds taken: Yes

Any med S/E: None reported, none observed





Mental Status Exam



Appearance: appropriately dressed

Eye contact: Good

Behavior: Cooperative

Speech: hyperverbal, continues to be accelerated and loud, intrusive

Mood: animated

Affect: anxious

Thought process: WNL

Thought Content: Perseveration over alcoholism.

Cognition: A&O

Insight: Fair

Judgment: Fair





Interventions:

PRN's used: none at this time



Therapeutic interventions: Ensured contract for safety, maintained a safe and supportive 
environment, encouraged independent performance of ADLs, monitored behavior and need for 
intervention, educated to pt. to let staff know if she continues to experience insomnia, and 
maintained Q 15 min safety checks. 

Restraints/seclusion/emergency medication: N/A





Justification of Continued Inpatient Treatment: Pt. continues to require medication 
adjustments and a safe and supportive environment.

## 2019-09-13 NOTE — NUR
Nursing Progress Note:



Legal hold: Voluntary



Client on voluntary DTS



Report received from nurse with use of SBAR: JUSTINA Schultz



Why are they here: Pt admitted  from [facility]  on 5150 for DTS. Pt has a plan to break a 
bottle to cut her wrists or overdose on pills. [facility] but is on a 
30 day out. She reports feeling depressed, hopeless, and helpless. Pt unable to formulate a  
safety plan at this time. She reports prior suicide attempts. 



Assessment



What has happened this shift: 

Patient awake in her bed at the beginning of shift. Patient remained in her bed most of the 
shift; during time awake she sat up in bed to read. MOM provided with no results at this 
time. Patient cooperative with all medications and assessment. Patient initiated 
conversation and brought up possibility of going to AA to seek help with alcoholism. Patient 
explained understanding of not having control of alcohol and not having the ability to not 
drink when it is present. Patient stated "I always drink what I buy. If I buy an 1/8th I 
drink an 1/8th, when I buy a pint I drink a pint." Patient explained she mainly drinks hard 
alcohol. Patient explained understanding of starting fluoxetine when identifying her HS 
pills. She began to joke about growing wings and prancing down the quesada and went back to 
explain she was just joking and did not believe this to actually happen. She then expressed 
she had some anxiousness due to starting a new medication.  



S/I, H/I: denies

A/VH: denies

Sleep: asleep at this time

ADL's: Independent, uses FWW for ambulation

Group attendance: no groups this shift

Were meds taken: Yes

Any med S/E: None reported, none observed





Mental Status Exam



Appearance: appropriately dressed, strong odor

Eye contact: Good

Behavior: Cooperative

Speech: hyperverbal, continues to be accelerated and loud, interrupts others

Mood: animated

Affect: anxious

Thought process: WNL

Thought Content: Perseveration over alcoholism.

Cognition: A&O

Insight: Fair

Judgment: Fair





Interventions

PRN's used: none at this time



Therapeutic interventions: Ensured contract for safety, maintained a safe and supportive 
environment, encouraged independent performance of ADLs, monitored behavior and need for 
intervention, educated to pt. to let staff know if she continues to experience insomnia, and 
maintained Q 15 min safety checks. 

Restraints/seclusion/emergency medication: N/A





Justification of Continued Inpatient Treatment: Pt. continues to require medication 
adjustments and a safe and supportive environment.

## 2019-09-13 NOTE — NUR
Patient complains of bilateral shoulder pain. Described as an ache. 8 on a scale of 0-10. 
Patient states this pain increases with any arm movement. "It's my arthritis." Tylenol given 
PO.

## 2019-09-14 NOTE — NUR
Nursing Progress Note: Suni



Legal hold: Voluntary



Client on voluntary DTS



Report received from Vandana GREENE with use of SBAR: 



Why are they here: Pt admitted from [facility] on 5150 for DTS. Pt has a plan to break a 
bottle to cut her wrists or overdose on pills. [facility] but is on a 
30 day out. She reports feeling depressed, hopeless, and helpless. Pt unable to formulate a 
safety plan at this time. She reports prior suicide attempts. 



Assessment



What has happened this shift: Patient was in hallway sitting on her personal walker at shift 
change. She jokingly states she is going to file a formal complaint with the  because she 
believes breakfast should be served at 0700. Initially patient refused her AM prozac, 
however this writer reminded her that she had made an agreement with the provider that she 
would take it in the morning. Through much grumbling, she took the medication and then 
stated "I dont know why I should do what I said, when he doesn't do what he promises" This 
was apparently referring to her Motrin order which I assured her was ordered. Remained in 
community room most of day working on word puzzles and talking with other clients on the 
unit. 



S/I, H/I: denies

A/VH: denies

Sleep: 5.25

ADL's: Independent, uses FWW for ambulation

Group attendance: 

Were meds taken: Refused Colace.

Any med S/E: None reported, none observed



Mental Status Exam



Appearance: appropriately dressed, disheveled

Eye contact: Good

Behavior: Cooperative

Speech: loud, irritable

Mood: agitated

Affect: congruent to mood

Thought process: linear

Thought Content: Perseveration not taking meds, wanting regular pain medications

Cognition: A&O

Insight: Fair

Judgment: Fair



Interventions

PRN's used: Motrin and Tylenol



Therapeutic interventions: Ensured contract for safety, maintained a safe and supportive 
environment, encouraged independent performance of ADLs, monitored behavior and need for 
intervention, educated to pt. to let staff know if she continues to experience insomnia, and 
maintained Q 15 min safety checks. 

Restraints/seclusion/emergency medication: N/A



Justification of Continued Inpatient Treatment: Pt. continues to require medication 
adjustments and a safe and supportive environment. 

-------------------------------------------------------------------------------

Addendum: 09/14/19 at 1707 by Kristie Reed RN

-------------------------------------------------------------------------------

Patient reports when taking Prozac at night she was unable to sleep, when taking it in the 
morning she is too sleepy. Wants to try something different. States she spoke with her 
provider about it.

## 2019-09-14 NOTE — NUR
Nursing Progress Note:



Legal hold: Voluntary



Client on voluntary DTS



Report received from nurse with use of SBAR: RAMONA Brown



Why are they here: Pt admitted  from [facility]  on 5150 for DTS. Pt has a plan to break a 
bottle to cut her wrists or overdose on pills. [facility] mission but is on a 
30 day out. She reports feeling depressed, hopeless, and helpless. Pt unable to formulate a  
safety plan at this time. She reports prior suicide attempts. 



Assessment



What has happened this shift: 

Patient in the group room watching TV at the beginning of shift. Patient refused HS Prozac 
and explained she is only willing to take in the AM because she doesn't like the way it 
makes her feel at night. Patient also refused Colace stating "you become dependent on that 
stuff if you take it for too long." Patient became agitated, raising her voice explaining 
her frustration on Prozac schedule and not having Motrin available. She was easily 
redirected when this writer explained Prozac scheduled to change 9/14 to AM and Motrin 
prescribed PRN. Patient provided Tylenol and Motrin for arthritis upon request. Shortly 
after HS snack patient went into her bedroom to read for a brief time before falling asleep. 




S/I, H/I: denies

A/VH: denies

Sleep: asleep at this time

ADL's: Independent, uses FWW for ambulation

Group attendance: no groups this shift

Were meds taken: Refused Colace and Prozac.

Any med S/E: None reported, none observed





Mental Status Exam



Appearance: appropriately dressed, disheveled

Eye contact: Good

Behavior: Cooperative

Speech: hyperverbal, continues to be accelerated and loud, interrupts others

Mood: agitated

Affect: congruent to mood

Thought process: linear

Thought Content: Perseveration over alcoholism.

Cognition: A&O

Insight: Fair

Judgment: Fair





Interventions

PRN's used: Motrin and Tylenol



Therapeutic interventions: Ensured contract for safety, maintained a safe and supportive 
environment, encouraged independent performance of ADLs, monitored behavior and need for 
intervention, educated to pt. to let staff know if she continues to experience insomnia, and 
maintained Q 15 min safety checks. 

Restraints/seclusion/emergency medication: N/A





Justification of Continued Inpatient Treatment: Pt. continues to require medication 
adjustments and a safe and supportive environment.

## 2019-09-15 NOTE — NUR
Nursing Progress Note: Suni



Legal hold: Voluntary



Client on voluntary DTS



Report received from RAMONA Ross with use of SBAR:



Why are they here: Pt admitted from [facility] on 5150 for DTS. Pt has a plan to break a 
bottle to cut her wrists or overdose on pills. [facility] the mission but is on a 
30 day out. She reports feeling depressed, hopeless, and helpless. Pt unable to formulate a  
safety plan at this time. She reports prior suicide attempts. 



Assessment



What has happened this shift: Patient was just getting out of bed when approached by this 
writer. She ambulated to the quesada, appeared very stiff. She explained she just needed to 
warm up her joints. Offered her Motrin and Tylenol which she advised she had taken at around 
3am. Patient is very calm cooperative and talkative. She refused Prozac which she states she 
informed the provider about and was noted in the providers notes. Will have a second 
interview tomorrow for [facility] for possible placement.



S/I, H/I: denies

A/VH: denies

Sleep: 7.25

ADL's: Independent, uses FWW for ambulation

Group attendance: no groups this shift

Were meds taken: yes

Any med S/E: Patient reports increased sedation during the day and increased restlessness 
during the night when taking Prozac.





Mental Status Exam



Appearance: disheveled, dressed in green scrubs, unshaved, greasy hair

Eye contact: Good

Behavior: Cooperative

Speech: WNL

Mood: cooperative

Affect: congruent to mood

Thought process: linear

Thought Content: dislike for Prozac

Cognition: A&O

Insight: Fair

Judgment: Fair





Interventions

PRN's used: Motrin and Tylenol



Therapeutic interventions: Ensured contract for safety, maintained a safe and supportive 
environment, encouraged independent performance of ADLs, monitored behavior and need for 
intervention, educated to pt. to let staff know if she continues to experience insomnia, and 
maintained Q 15 min safety checks. 

Restraints/seclusion/emergency medication: N/A





Justification of Continued Inpatient Treatment: Pt. continues to require medication 
adjustments and a safe and supportive environment.

## 2019-09-15 NOTE — NUR
reassessment: Pt % meals meeting needs. LBM 9/14. No nutrition concerns at this time.

Recommend:

1. continue regular diet

2. continue bowel care

3. Weight per rx

-------------------------------------------------------------------------------

Addendum: 09/15/19 at 0931 by Daryl Chan RD

-------------------------------------------------------------------------------

Amended: Links added.

## 2019-09-16 NOTE — NUR
Nursing Progress Note:



Legal hold: Voluntary



Client on voluntary DTS



Report received from nurse with use of SBAR: RAMONA Brown



Why are they here: Pt admitted  from [facility]  on 5150 for DTS. Pt has a plan to break a 
bottle to cut her wrists or overdose on pills. [facility] but is on a 
30 day out. She reports feeling depressed, hopeless, and helpless. Pt unable to formulate a  
safety plan at this time. She reports prior suicide attempts. 



Assessment



What has happened this shift: 

Patient sitting in the group room at the beginning of shift and went back to her room to 
read her book shortly after HS snack where she has remained since. Patient reports 
depression and refuses SI, HI, A/VH. patient states she understands why she is un the unit 
but did not want to have a conversation about it "no sense in rehashing the past." Patient 
continued to say, "I don't know what I'm going to do with my life, I already messed up a 
meeting with the breezy from [facility]." Patient has a clear understanding she has another meeting 
with the [facility] and appears to be less resistive with the possibility of going there. Patient 
provided Motrin and Tylenol for shoulder pain/discomfort upon request.



S/I, H/I: denies

A/VH: denies

Sleep: asleep at this time

ADL's: Independent, uses FWW for ambulation

Group attendance: no groups this shift

Were meds taken: yes

Any med S/E: Patient reports increased sedation during the day and increased restlessness 
during the night when taking Prozac.





Mental Status Exam



Appearance: disheveled, dressed in green scrubs

Eye contact: Good

Behavior: Cooperative

Speech: hyperverbal, continues to be accelerated and loud, interrupts others

Mood: depressed

Affect: congruent to mood

Thought process: linear

Thought Content: meeting with the [facility] and discharge options

Cognition: A&O

Insight: Fair

Judgment: Fair





Interventions

PRN's used: Motrin and Tylenol



Therapeutic interventions: Ensured contract for safety, maintained a safe and supportive 
environment, encouraged independent performance of ADLs, monitored behavior and need for 
intervention, educated to pt. to let staff know if she continues to experience insomnia, and 
maintained Q 15 min safety checks. 

Restraints/seclusion/emergency medication: N/A





Justification of Continued Inpatient Treatment: Pt. continues to require medication 
adjustments and a safe and supportive environment.

## 2019-09-16 NOTE — NUR
Nursing Progress Note: Suni



Legal hold: Voluntary



Client on voluntary DTS



Report received from Kristie GREENE with use of SBAR: 



Why are they here: Pt admitted from [facility] on 5150 for DTS. Pt has a plan to break a 
bottle to cut her wrists or overdose on pills. [facility] but is on a 
30 day out. She reports feeling depressed, hopeless, and helpless. Pt unable to formulate a  
safety plan at this time. She reports prior suicide attempts. 



Assessment



What has happened this shift: Patient was approached in the recreation room shortly after 
change of shift, she stated her pain was pretty bad, but it would get better as she moved. 
Discussed upcoming interview for possible placement at [facility]. She states her depression is 
unchanged since her arrival on the unit and she continues to have SI. When asked if she has 
a plan she replies "I know I need another plan, cutting myself wont work. I may need to take 
pills. Why wont they just put me in a room and let me do it." States she knows her health is 
failing "they keep telling me I am fine, but I know I am not, I only have one kidney and 
they tell me it is working fine. Its old, it cant be working very well." Stated she wanted 
to remain out of bed following breakfast and then admits "I am so tired all of the time". 
Took a 33.5 hour nap following breakfast.  Refused prozac explaining that if she is 
prescribed it when she goes to [facility], she will have to take it or they will make her leave. 
Patient explains that she is concerned about the ongoing fatigue, she feels something is 
physically wrong rather than depression. Verbalizes frustration that "everyone just wants to 
give me a pill."



S/I, H/I: Admits to SI with plan to make a new plan

A/VH: denies

Sleep: 5

ADL's: Independent, uses FWW for ambulation

Group attendance: no

Were meds taken: Refused Prozac

Any med S/E: Patient reports increased sedation during the day and increased restlessness 
during the night when taking Prozac.





Mental Status Exam



Appearance: disheveled, dressed in dirty green scrubs

Eye contact: Good

Behavior: Cooperative

Speech: calm, even tone

Mood: depressed

Affect: congruent to mood

Thought process: linear

Thought Content: meeting with the [facility] and discharge options

Cognition: A&O

Insight: Fair

Judgment: Fair





Interventions

PRN's used: Motrin and Tylenol



Therapeutic interventions: Ensured contract for safety, maintained a safe and supportive 
environment, encouraged independent performance of ADLs, monitored behavior and need for 
intervention, educated to pt. to let staff know if she continues to experience insomnia, and 
maintained Q 15 min safety checks. 

Restraints/seclusion/emergency medication: N/A





Justification of Continued Inpatient Treatment: Pt. continues to require medication 
adjustments and a safe and supportive environment.

## 2019-09-17 NOTE — NUR
Nursing Progress Note: Suni



Legal hold: Voluntary



Client on voluntary DTS



Report received from rKistie HORN: 



Why are they here: Pt admitted from [facility] on 5150 for DTS. Pt has a plan to break a 
bottle to cut her wrists or overdose on pills. [facility] but is on a 
30 day out. She reports feeling depressed, hopeless, and helpless. Pt unable to formulate a  
safety plan at this time. She reports prior suicide attempts. 



Assessment



What has happened this shift: Pt was sitting in break room at change of shift. Pts 
assessment completed at bedside. Pt states she is suicidal but doesn't currently have a 
plan. She states "Im just tired of living, If I had some where to live that might be 
different, that would make me happy, but they don't want to get me a place to live because I 
smoke and that's the one thing in life that brings me happiness." Pt also complaining about 
taking a blood pressure medicine. Discussed smoking cessation w/patient and how giving up 
smoking might give her other things to be happy about and improve her blood pressure. Pt 
replied, "When you've been a smoker all your life and you lived to 75 then I'll listen." 
Encouraged pt to consider it. pt states today "was not a good day, but I'm glad its over, 
tomorrow is a new day." Acknowledged pts postive thinking for tomorrow. Pt reports appetite 
is good, sleeps good at night and chose to read a book prior to going to sleep. Pt spent 
some time pleasantly conversing with roommate before bed. 



S/I, H/I: S/I with no plan 

A/VH: denies

Sleep: good 

ADL's: Independent, uses FWW for ambulation

Group attendance: no

Were meds taken: Yes, pt complains about taking bp medicine. Explained to pt why she takes 
this and she took med.

Any med S/E: Pt reports feeling fatigued 





Mental Status Exam



Appearance: disheveled, dressed in dirty green scrubs, encouraged shower but patient 
declined

Eye contact: Good

Behavior: Cooperative

Speech: calm, even tone

Mood: depressed

Affect: congruent to mood

Thought process: linear

Thought Content: hopeful about finding a place but would like a place she can smoke at 

Cognition: A&O

Insight: Fair

Judgment: Fair





Interventions

PRN's used: none



Therapeutic interventions: Ensured contract for safety, maintained a safe and supportive 
environment, encouraged independent performance of ADLs, monitored behavior and need for 
intervention, educated to pt. to let staff know if she continues to experience insomnia, and 
maintained Q 15 min safety checks. 

Restraints/seclusion/emergency medication: N/A





Justification of Continued Inpatient Treatment: Pt. continues to require medication 
adjustments and a safe and supportive environment. 

-------------------------------------------------------------------------------

Addendum: 09/17/19 at 0208 by Stephanie Cifuentes RN

-------------------------------------------------------------------------------

Pt woke at 0200 requesting prn ibuprofen and tylenol for shoulder pain

## 2019-09-17 NOTE — NUR
Nursing Progress Note: Suni



Legal hold: Voluntary



Client on voluntary DTS



Report received from Kristie HORN: 



Why are they here: Pt admitted from [facility] on 5150 for DTS. Pt has a plan to break a 
bottle to cut her wrists or overdose on pills. [facility] but is on a 
30 day out. She reports feeling depressed, hopeless, and helpless. Pt unable to formulate a  
safety plan at this time. She reports prior suicide attempts. 



Assessment



What has happened this shift: 

Received report and received client in bed with eyes closed. Even, unlabored respirations 
noted with no apparent sign of distress or problems. Client woke for medication and 
assessment and was compliant with both. Client ate morning meal and returned to her room to 
rest. No behavioral issues as of this writing. Client attended am Group but fell asleep 
during meeting. She was rejected by [facility] for placement after discharge. Client has 
participated in group activities today. She remains irritable and intrusive on the unit.



S/I, H/I: S/I with no plan 

A/VH: denies

Sleep: good 

ADL's: Independent, uses FWW for ambulation

Group attendance: yes

Were meds taken: Yes, pt complains about taking bp medicine. Explained to pt why she takes 
this and she took med.

Any med S/E: Pt reports feeling fatigued 





Mental Status Exam



Appearance: disheveled, dressed in dirty green scrubs, encouraged shower but patient 
declined

Eye contact: Good

Behavior: Cooperative

Speech: calm, even tone

Mood: depressed

Affect: congruent to mood

Thought process: linear

Thought Content: hopeful about finding a place but would like a place she can smoke at 

Cognition: A&O

Insight: Fair

Judgment: Fair





Interventions

PRN's used: none



Therapeutic interventions: Ensured contract for safety, maintained a safe and supportive 
environment, encouraged independent performance of ADLs, monitored behavior and need for 
intervention, educated to pt. to let staff know if she continues to experience insomnia, and 
maintained Q 15 min safety checks. 

Restraints/seclusion/emergency medication: N/A

## 2019-09-17 NOTE — NUR
Nursing Progress Note: Suni



Legal hold: Voluntary



Client on voluntary DTS



Report received from Stephanie GREENE: 



Why are they here: Pt admitted from [facility] on 5150 for DTS. Pt has a plan to break a 
bottle to cut her wrists or overdose on pills. [facility] but is [facility] a 
30 day out. She reports feeling depressed, hopeless, and helpless. Pt unable to formulate a  
safety plan at this time. She reports prior suicide attempts. 



Assessment



What has happened this shift: 

Pt was laying in bed w/her book on her lap at change of shift. Pt was pleasant, denies s/i 
stating "I'm not a murderer, I just feel different about dying than most people, I'm 74 and 
I just don't want to live any more. Pt denies any plan to harm herself. Pt states "I guess 
they don't want me at the [facility] because I'm too mean. I don't know if the doctor caught this 
but it's my impression they just want a made to cook and clean for them and I can't sweep 
and mop floors." Explained to pt that the patients usually share responsibility of chores 
and perhaps they were just trying to determine what area she could possibly help out in. Pt 
nods her head as if she understands. pt states she will just need to find somewhere else to 
go. Pt is calm, cooperative. She requests her blood pressure medicine later in the evening 
and some milk of mag for constipation. pt was given prn for constipation and med compliant. 
Pt reports appetite is good and sleep is fair, she woke last night due to shoulder pain so 
she would like prn for pain at hs. 



S/I, H/I: denies plan stating she just doesnt want to live anymore 

A/VH: denies

Sleep: good 

ADL's: Independent, uses FWW for ambulation

Group attendance: no evening groups offered 

Were meds taken: Yes 

Any med S/E: Pt reports feeling fatigued 





Mental Status Exam



Appearance: disheveled, changed scrubs, encouraged shower but patient declined

Eye contact: Good

Behavior: Cooperative

Speech: normal rate and rhythm 

Mood: depressed

Affect: congruent to mood

Thought process: linear

Thought Content: hopeful about finding housing 

Cognition: A&O

Insight: Fair

Judgment: Fair





Interventions

PRN's used: motrin, tylenol, mom



Therapeutic interventions: Ensured contract for safety, maintained a safe and supportive 
environment, encouraged independent performance of ADLs, monitored behavior and need for 
intervention, educated to pt. to let staff know if she continues to experience insomnia, and 
maintained Q 15 min safety checks. 

Restraints/seclusion/emergency medication: N/A



Justification of Continued Inpatient Treatment: Pt. continues to require medication 
adjustments and a safe and supportive environment. 

-------------------------------------------------------------------------------

Addendum: 09/17/19 at 2141 by Stephanie Cifuentes RN

-------------------------------------------------------------------------------

Pt complains of numbness in her hands stating she had a soft tissue injury in her neck 
several years ago, she states numbness comes and goes and she would like someone to check 
and see if anything can be done to help her with numbness besides medication we are giving 
her. Pt was assisted w/repositioning in bed and given addtl blankets for comfort

## 2019-09-18 NOTE — NUR
Nursing Progress Note:



Legal hold: Voluntary



Client on voluntary DTS



Report received from Jesus GREENE: 



Why are they here: Pt admitted from [facility] on 5150 for DTS. Pt has a plan to break a 
bottle to cut her wrists or overdose on pills. [facility] but is on a 
30 day out. She reports feeling depressed, hopeless, and helpless. Pt unable to formulate a  
safety plan at this time. She reports prior suicide attempts. 



Assessment



What has happened this shift: Pt was sitting in the group room at change of shift. Pt states 
that her day was "as good as could be expected." pt endorses depression and states she 
wishes she had somewhere to go. Encouraged pt to shower and she states she will do it in the 
morning because she is tired. She states she would like to shave and trim her fingernails, 
told pt we can assist her tonight as we have more techs and pt declined. Pt was given clean 
scrubs to change. Pt continues to c/o about "nerve and joint pain" in her shoulders and 
neck. She requests tylenol and motrin before bed then routinely wakes up in the morning 
requesting a hot pack.  Pts appetite is good and she sleeps well during the night 





S/I, H/I: denies

A/VH: denies

Sleep: 6.75hrs NOC

ADL's: Independent, uses FWW for ambulation

Group attendance: yes

Were meds taken: yes 

Any med S/E: none reported



Mental Status Exam



Appearance: hair brushed, malodorous, encouraged shower but patient declined, clean scrubs 
provided 

Eye contact: direct

Behavior: cranky but pleasant at times

Speech: soft tone, normal rate and rhythm 

Mood: depressed

Affect: flat

Thought process: linear

Thought Content: hopeful about finding housing 

Cognition: A&O

Insight: Fair

Judgment: Fair





Interventions

PRN's used: motrin, tylenol



Therapeutic interventions: 1:1 therapeutic assessment, maintained safe therapeutic milieu, 
provided active listening with positive reinforcement, provided medication 
administration/education/monitoring as needed; Q15 safety checks.



Restraints/seclusion/emergency medication: N/A



Justification of Continued Inpatient Treatment: Continued therapeutic support and medication 
management needed to provide stabilization, prevent decompensation, improve coping 
mechanisms decreasing risk to patient and re-admittance.

## 2019-09-18 NOTE — NUR
Nursing Progress Note:



Legal hold: Voluntary



Client on voluntary DTS



Report received from Diane GREENE: 



Why are they here: Pt admitted from [facility] on 5150 for DTS. Pt has a plan to break a 
bottle to cut her wrists or overdose on pills. [facility] but is on a 
30 day out. She reports feeling depressed, hopeless, and helpless. Pt unable to formulate a  
safety plan at this time. She reports prior suicide attempts. 



Assessment



What has happened this shift: 



Patient is observed in her room at change of shift. She reports pain in her shoulders and 
neck, requesting tylenol and motrin. She states that she did not sleep well last night. At 
med pass she refuses prozac reporting that she took it the other night and it caused her to 
be awake and then when she took it the following day it caused her to sleep all day. She did 
not relate the daytime sleeping to lack of sleep the evening prior. She reports she has 
improved mood without the medication.



Patient attends groups and her demeanor is relatively pleasant.



S/I, H/I: denies

A/VH: denies

Sleep: 6.75hrs NOC

ADL's: Independent, uses FWW for ambulation

Group attendance: yes

Were meds taken: refused prozac

Any med S/E: none reported



Mental Status Exam



Appearance: hair brushed, malodorous, encouraged shower but patient declined

Eye contact: direct

Behavior: cranky but pleasant at times

Speech: soft tone, normal rate and rhythm 

Mood: depressed

Affect: flat

Thought process: linear

Thought Content: hopeful about finding housing 

Cognition: A&O

Insight: Fair

Judgment: Fair





Interventions

PRN's used: motrin, tylenol



Therapeutic interventions: 1:1 therapeutic assessment, maintained safe therapeutic milieu, 
provided active listening with positive reinforcement, provided medication 
administration/education/monitoring as needed; Q15 safety checks.



Restraints/seclusion/emergency medication: N/A



Justification of Continued Inpatient Treatment: Continued therapeutic support and medication 
management needed to provide stabilization, prevent decompensation, improve coping 
mechanisms decreasing risk to patient and re-admittance.

## 2019-09-18 NOTE — NUR
Group Art Therapy (Continued):  Patient was able to follow all directives, finding a 
meaningful picture 

and writing a word or sentence of affirmation. She was able to share with the group.



She made a significant effort to find a positive picture/statement she could make. 

She selected a picture of a polar bear who was holding a sign that said "Grr..." She noted, 
"See I'm not the only 

one that feels this way sometimes".... for her Positive Affirmations she wrote:  Hug 
Yourself, Love 

Yourself".... She then added "You have to love yourself first,...nobody else will." Patient 
also made an effort to listen to others who were sharing. She made few comments, however, 
presented as content demonstrating some ability to regulate her thoughts and feelings. 



Jennyfer Cheema M.A.

Licensed Marriage, Family Therapist #[id number]

[facility] Art Therapist

-------------------------------------------------------------------------------

Addendum: 09/18/19 at 1709 by Jennyfer Cheema SS

-------------------------------------------------------------------------------

Amended: Links added.

## 2019-09-19 NOTE — NUR
Nursing Progress Note:



Legal hold: Voluntary for DTS

Report received from Diane GREENE: 



Why are they here: Pt admitted from [facility] on 5150 for DTS. Pt has a plan to break a 
bottle to cut her wrists or overdose on pills. [facility] but is on a 
30 day out. She reports feeling depressed, hopeless, and helpless. Pt unable to formulate a  
safety plan at this time. She reports prior suicide attempts. 



Assessment



What has happened this shift: Patient is observed laying in her bed at change of shift. She 
sits up to converse with this RN. She states that she did not sleep well the night before 
and does not know why. She is mildly pleasant in conversation telling this RN that the name 
of her stuffed animal puppy is Clifton Godoy and that Clifton Godoy has been her best friend for the 
last year. Patient requests a laundry basket to put her dirty clothing in. 

She refuses prozac but takes DSS, Tylenol and motrin.

Patient attends meals and socializes in the group room with others. 



S/I, H/I: denies

A/VH: denies

Sleep: 7.255hrs NOC and rested during the day

ADL's: Independent, uses FWW for ambulation

Group attendance: yes

Were meds taken: yes 

Any med S/E: none reported



Mental Status Exam



Appearance: hair brushed, malodorous, encouraged shower but patient declined.

Eye contact: direct

Behavior: less cranky than in prior shifts, pleasant at times

Speech: soft tone, normal rate and rhythm 

Mood: depressed

Affect: flat

Thought process: linear

Thought Content:  finding housing 

Cognition: A&O

Insight: Fair

Judgment: Fair





Interventions

PRN's used: motrin, tylenol



Therapeutic interventions: 1:1 assessment, establishment of rapport, maintained safe 
therapeutic milieu, provided active listening with positive feedback, provided medication 
education and monitored for effects, monitored for change in behavior and provided needed 
interventions. Q 15 minute safety checks.



Restraints/seclusion/emergency medication: N/A



Justification of Continued Inpatient Treatment: Continued therapeutic support and medication 
management needed to provide stabilization, prevent decompensation, improve coping 
mechanisms decreasing risk to patient and re-admittance.

## 2019-09-20 NOTE — NUR
Nursing Progress Note:Suni



Legal hold: Voluntary



Client on voluntary DTS



Report received from CRN



Why are they here: Pt admitted from [facility] on 5150 for DTS. Pt has a plan to break a 
bottle to cut her wrists or overdose on pills. [facility] but is on a 
30 day out. She reports feeling depressed, hopeless, and helpless. Pt unable to formulate a  
safety plan at this time. She reports prior suicide attempts. 



Assessment



What has happened this shift: 

Client was in bed to start this shift. Woke for medications and assessment. She c/o pain in 
left arm rated at 7 on a 10 scale. Client was given 975 mg of Tylenol and not the 650 
indicated for fever. She also took PRN of Motrin at the same time all with good effect. 
Client visible in Group Room for breakfast and she was social with peers while at breakfast. 
She was unsuccessful in her placement interview and client is, "stressed out because nobody 
wants me". Client attended Group activities today and was not as loud and intrusive. Client 
feels that her Doctor does not like her and she further feels that Doctor is blocking her 
discharge. Patient education was given as it pertains to medication compliance. She 
dismissed the information given as, "bullshit".



S/I, H/I: Pt denies. 

A/VH: Pt denies. None observed.

Sleep: See sleep assessment notation.

ADL's: Independent, uses FWW for ambulation

Group attendance: yes

Were meds taken: Refuses Prozac 

Any med S/E: None reported or observed.



Mental Status Exam



Appearance: disheveled

Eye contact: Direct

Behavior: Cooperative, negative

Speech: Soft tone, normal rate and rhythm 

Mood: Depressed

Affect: Blunted

Thought process: Linear

Thought Content: "I have no where to go"

Cognition: A&O

Insight: Fair

Judgment: Fair





Interventions



PRN's used: Motrin, Tylenol



Therapeutic interventions: 1:1 therapeutic assessment, maintained safe therapeutic milieu, 
provided active listening with positive reinforcement, provided medication 
administration/education/monitoring as needed; Q15 safety checks.



Restraints/seclusion/emergency medication: N/A



Justification of Continued Inpatient Treatment: Continued therapeutic support and medication 
management needed to provide stabilization, prevent decompensation, improve coping 
mechanisms decreasing risk to patient and re-admittance.

## 2019-09-20 NOTE — NUR
Nursing Progress Note:





Legal hold: Voluntary



Client on voluntary for DTS



Report received from JUSTINA Scott with use of SBAR



Why are they here: Pt admitted from [facility] on 5150 for DTS. Pt has a plan to break a 
bottle to cut her wrists or overdose on pills. [facility] but is on a 
30 day out. She reports feeling depressed, hopeless, and helpless. Pt unable to formulate a  
safety plan at this time. She reports prior suicide attempts. 



Assessment



What has happened this shift: Patient sitting in her normal spot in the group room at shift 
change. Pt sits by herself watching T.V. Pt is malodorous and is asked if she would like to 
shower. Pt refuses "I will shower in the morning." When asked how her day was pt 
perseverates on how people are against her. MICHA Rodriguez "just wants to kick me out." Pt also 
refers to the person who interviewed her from [facility] "he just doesn't like me and he walked 
away saying I was mean." Pt also perseverates on being homeless and how the homeless are 
treated and past life events, especially about losing her children to . Pt 
endorses depression and passive SI with no plan. "I have been depressed for 30 years." "I 
don't know why people don't understand why I want to die, I have nothing and I am all 
alone." Noted L>R LE edema 3+, no complaints of pain in that left leg. PRN Motrin and 
Tylenol admin with HS meds. Medication compliant and cooperative throughout shift and with 
1:1 assessment. 



S/I, H/I: Passive - no plan. Denies H/I

A/VH: Pt denies. None observed.

Sleep: See sleep assessment notation.

ADL's: Independent, uses FWW for ambulation

Group attendance: Night shift, no group.

Were meds taken: Medication compliant. 

Any med S/E: None reported or observed.



Mental Status Exam



Appearance:  Dressed in green unit scrubs, malodorous, encouraged shower but patient 
declined, "I will shower in the morning."

Eye contact: Direct

Behavior: Cooperative, negative, hopeless

Speech: Soft tone, normal rate and rhythm 

Mood: Depressed, hopeless

Affect: Constricted

Thought process: Linear

Thought Content: "I have no one". Perseverating on past events.

Cognition: A&O

Insight: Poor

Judgment: Poor





Interventions



PRN's used: Motrin, Tylenol



Therapeutic interventions: 1:1 therapeutic assessment, maintained safe therapeutic milieu, 
provided active listening with positive reinforcement, provided medication 
administration/education/monitoring as needed; Q15 safety checks.



Restraints/seclusion/emergency medication: N/A



Justification of Continued Inpatient Treatment: Continued therapeutic support and medication 
management needed to provide stabilization, prevent decompensation, improve coping 
mechanisms decreasing risk to patient and re-admittance.

## 2019-09-20 NOTE — NUR
Nursing Progress Note:



Legal hold: Voluntary



Client on voluntary DTS



Report received from JUSTINA Scott with use of SBAR



Why are they here: Pt admitted from [facility] on 5150 for DTS. Pt has a plan to break a 
bottle to cut her wrists or overdose on pills. [facility] but is on a 
30 day out. She reports feeling depressed, hopeless, and helpless. Pt unable to formulate a  
safety plan at this time. She reports prior suicide attempts. 



Assessment



What has happened this shift: Pt was sitting in group room by herself watching TV at shift 
change. Pt is cooperative and asks if the channel can be turned from news. Pt remains  there 
through med pass and HS snack. Pt is compliant with medication and 1:1 assessment. Pt's 
Norvasc was decreased to 2.5mg HS. Pt states "I am depressed and have no where to go." "If I 
knew that at the age of 74 that I would be homeless and alone I am not sure what I would 
have done." "I can't go back to the mission." Pt retires to bed around 2100 and requests PRN 
Tylenol and Motrin for generalized pain. Pt was encouraged to shower "I will take one in the 
morning if they wake me up." Pt 



S/I, H/I: Pt denies. 

A/VH: Pt denies. None observed.

Sleep: See sleep assessment notation.

ADL's: Independent, uses FWW for ambulation

Group attendance: Night shift, no group.

Were meds taken: Medication compliant. 

Any med S/E: None reported or observed.



Mental Status Exam



Appearance: Hair brushed, malodorous, encouraged shower but patient declined, "I will shower 
in the morning."

Eye contact: Direct

Behavior: Cooperative, negative

Speech: Soft tone, normal rate and rhythm 

Mood: Depressed

Affect: Blunted

Thought process: Linear

Thought Content: "I have no where to go"

Cognition: A&O

Insight: Fair

Judgment: Fair





Interventions



PRN's used: Motrin, Tylenol



Therapeutic interventions: 1:1 therapeutic assessment, maintained safe therapeutic milieu, 
provided active listening with positive reinforcement, provided medication 
administration/education/monitoring as needed; Q15 safety checks.



Restraints/seclusion/emergency medication: N/A



Justification of Continued Inpatient Treatment: Continued therapeutic support and medication 
management needed to provide stabilization, prevent decompensation, improve coping 
mechanisms decreasing risk to patient and re-admittance.

## 2019-09-20 NOTE — NUR
During Group this afternoon, client wrote that she is so tired of this life and further 
requested that she be helped to die. This is concerning because earlier in shift, client 
readily contracted for safe unit behaviors.

## 2019-09-21 NOTE — NUR
Nursing Progress Note:



Legal hold: N/A



Client is voluntary



Report received from Janeth HORN with use of SBAR



Why are they here: Pt admitted from [facility] on 5150 for DTS. Pt has a plan to break a 
bottle to cut her wrists or overdose on pills. [facility] but is on a 
30 day out. She reports feeling depressed, hopeless, and helpless. Pt unable to formulate a  
safety plan at this time. She reports prior suicide attempts. 



Assessment



What has happened this shift: Pt refused her Prozac this morning, stated that it made her 
restless when she took it at bedtime and sleepy when she took it in the morning. Medication 
education provided that it is common to experience some initial drowsiness with 
antidepressants for at least the first couple of weeks and then will usually subside. Pt 
states she doesn't want to talk to Agustin as he's not a real doctor, wants to speak with Dr Rai as he is the only one here who knows what he is doing. Pt also stated that she is 
not going to take Zoloft, seemed to think it was Agustin's idea and not Dr Rai's. Asked pt 
if there were any antidepressant that she would consider taking. Pt replied that she would 
take Ativan. Reminded pt that this was not an antidepressant. 



Pt continues to be depressed with passive SI she states that she does not want to hurt 
herself but she just wants to die. "I'm 74 years old, I'm crippled, I'm homeless, no family, 
nobody wants me, no pill is going to fix that. I want to die, I have that right." Pt 
requested prn ibuprofen 400 mg after breakfast at 0820 for 5/10 bilateral shoulder pain, she 
states that the right shoulder usually hurts more than the left. Pt also wished to take prn 
Tylenol 925 mg at the same time as the ibuprofen 400 mg. Encouraged pt to stagger the meds 
for better effect, encouraged her to try waiting at least 1/2 hour. Pt did not request prn 
Tylenol again until 1130 then complained that she had gotten it 2 hours late. Medication 
education provided that it is an as needed medication and that nurses usually do not 
administer 2 different prn pain medications at the same time per pharmacy guidelines.



S/I, H/I: Passive SI, denies HI

A/VH: Pt denies

Sleep: Slept 6 hours per noc shift report

ADL's: Independent, uses FWW for ambulation

Group attendance: no group this morning

Were meds taken: Pt refused Prozac, only took stool softener, Tylenol and ibuprofen

Any med S/E: None noted or reported



Mental Status Exam



Appearance:  Disheveled, food stains on scrubs

Eye contact: Good

Behavior: irritable, resistive to care

Speech: Soft tone, normal rate and rhythm 

Mood: Depressed, hopeless, irritable

Affect: Depressed, irritable

Thought process: Linear

Thought Content: Hopeless, helpless, wants to die

Cognition: A/O X 4.

Insight: Poor

Judgment: Poor





Interventions



PRN's used: ibuprofen 400 mg, Tylenol 975 mg



Therapeutic interventions: 1:1 assessment, active listening, medication 
administration/education/monitoring, encouragement to perform personal hygiene, Q15 safety 
checks.



Restraints/seclusion/emergency medication: N/A



Justification of Continued Inpatient Treatment: Continued therapeutic support and medication 
management needed to provide stabilization, prevent decompensation, improve coping 
mechanisms decreasing risk to patient and re-admittance.

## 2019-09-22 NOTE — NUR
Nursing Progress Note:





Legal hold: Voluntary



Client on voluntary for DTS



Report received from JUSTINA Scott with use of SBAR



Why are they here: Pt admitted from [facility] on 5150 for DTS. Pt has a plan to break a 
bottle to cut her wrists or overdose on pills. [facility] but is [facility] a 
30 day out. She reports feeling depressed, hopeless, and helpless. Pt unable to formulate a  
safety plan at this time. She reports prior suicide attempts. 



Assessment



What has happened this shift: Pt sitting in group room watching T.V and doing a word search 
puzzle. Pt irritable, but cooperative.  Pt remains in group room throughout the evening then 
retires to her room around 2130. Pt's 1:1 is completed at bedside. Pt perseverates on how 
"everyone is in control of her medications and they are administering them to her all 
wrong." Pt perseverates that "Agustin isn't a real doctor and no around here knows what they are 
dong." Pt when asked to find something positive about her life "I have nothing positive, I 
am old and alone. I just want to die." Pt talks about not wanting to live, but has no plan. 
Pt was compliant with 1:1 assessment and took all meds except for her stool softener.



S/I, H/I: Passive - no plan. Denies H/I

A/VH: Pt denies. None observed.

Sleep: See sleep assessment notation.

ADL's: Independent, uses FWW for ambulation

Group attendance: Night shift, no group.

Were meds taken: Took all meds except for her stool softener

Any med S/E: None reported or observed.



Mental Status Exam



Appearance:  Dressed in green unit scrubs, malodorous, encouraged shower but patient 
declined, "I will shower in the morning."

Eye contact: Direct

Behavior: Cooperative, irritable, hopeless

Speech: Soft tone, normal rate and rhythm 

Mood: Depressed, hopeless

Affect: Constricted

Thought process: Linear

Thought Content: "I have no one". Perseverating on past events.

Cognition: A&O

Insight: Poor

Judgment: Poor





Interventions



PRN's used: Motrin, Tylenol



Therapeutic interventions: 1:1 therapeutic assessment, maintained safe therapeutic milieu, 
provided active listening with positive reinforcement, provided medication 
administration/education/monitoring as needed; Q15 safety checks.



Restraints/seclusion/emergency medication: N/A



Justification of Continued Inpatient Treatment: Continued therapeutic support and medication 
management needed to provide stabilization, prevent decompensation, improve coping 
mechanisms decreasing risk to patient and re-admittance.

## 2019-09-22 NOTE — NUR
reassessment: Pt % meals meeting needs. LBM 9/21. No nutrition

concerns at this time.

Recommend:

1. continue regular diet

2. continue bowel care

3. Weight per rx

-------------------------------------------------------------------------------

Addendum: 09/22/19 at 0959 by Daryl Chan RD

-------------------------------------------------------------------------------

Amended: Links added.

## 2019-09-22 NOTE — NUR
Nursing Progress Note:



Legal hold: N/A



Client is voluntary



Report received from Janeth HORN with use of SBAR



Why are they here: Pt admitted from [facility] on 5150 for DTS. Pt has a plan to break a 
bottle to cut her wrists or overdose on pills. [facility] but is on a 
30 day out. She reports feeling depressed, hopeless, and helpless. Pt unable to formulate a  
safety plan at this time. She reports prior suicide attempts. 



Assessment



What has happened this shift: Pt refused her Prozac again this morning stating that she is 
waiting to speak with Dr Rai. Pt continues to be depressed with passive SI. Pt c/o 
pain before breakfast. Asked pt if it was her shoulders hurting again. She replied, "yeah 
and my legs, my heart, my soul, my brain." Pt wished to take prn Tylenol 975 mg and 
ibuprofen 400 mg at the same time, she is not concerned about any potential long term 
adverse side effects, gave both meds at 0740.



S/I, H/I: Passive SI, denies HI

A/VH: Pt denies

Sleep: Slept well per noc shift report

ADL's: Independent, uses FWW for ambulation

Group attendance: no group this morning

Were meds taken: Pt refused Prozac, only took stool softener, Tylenol and ibuprofen

Any med S/E: None noted or reported



Mental Status Exam



Appearance:  Hair pulled back in a head band, wearing hospital scrubs

Eye contact: Good

Behavior: irritable

Speech: Soft tone, normal rate and rhythm 

Mood: Depressed, hopeless, irritable

Affect: Depressed, irritable

Thought process: Linear

Thought Content: Everything hurts including her heart and soul

Cognition: A/O X 4.

Insight: Poor

Judgment: Poor





Interventions



PRN's used: ibuprofen 400 mg, Tylenol 975 mg



Therapeutic interventions: 1:1 assessment, active listening, medication 
administration/education/monitoring, encouragement to perform personal hygiene, Q15 safety 
checks.



Restraints/seclusion/emergency medication: N/A



Justification of Continued Inpatient Treatment: Continued therapeutic support and medication 
management needed to provide stabilization, prevent decompensation, improve coping 
mechanisms decreasing risk to patient and re-admittance.

## 2019-09-23 NOTE — NUR
Nursing Progress Note:



Legal hold: N/A



Client is voluntary



Report received from Navi PINEDO with use of SBAR



Why are they here: Pt admitted from [facility] on 5150 for DTS. Pt has a plan to break a 
bottle to cut her wrists or overdose on pills. [facility] mission but is on a 
30 day out. She reports feeling depressed, hopeless, and helpless. Pt unable to formulate a  
safety plan at this time. She reports prior suicide attempts. 



Assessment



What has happened this shift: Pt refused Prozac, still waiting to speak with Dr Rai. 
When asked how if she was having SI today, she replied, "That's like asking a wife beater if 
he's still beating his wife, if he says no, he still used to be a wife beater." Pt 
continued, "Is it active? No it's not active." Asked pt if she could think of anything that 
still makes her happy or brings her mg. Pt replied that "I just want to go home and see my 
lord." Asked pt if she had any unresolved issues or any amends she would like to make. Pt 
replied that she had made amends to the people she needed to make amends to and she's still 
waiting for some of them to make amends to her. Pt states that "I haven't seen anything in 
this world yet that makes me want to go on."



Pt has an appt with social security on 9/27/19. Plan is for her  to take her from 
her to the appointment. PA pushing for pt then going to a hotel with a food card. Pt able to 
return to the [facility] on 10/2/19.



S/I, H/I: Passive SI, denies HI

A/VH: Pt denies

Sleep: Slept well per noc shift report

ADL's: Independent, uses FWW for ambulation,uses pull up incontinent briefs

Group attendance: Yes

Were meds taken: Pt refused Prozac

Any med S/E: None noted or reported



Mental Status Exam



Appearance:  Hair pulled back in a head band, wearing hospital scrubs with old food stains 
on top

Eye contact: Good

Behavior: Cooperative

Speech: Soft tone, normal rate and rhythm 

Mood: Depressed, hopeless

Affect: Depressed

Thought process: Linear

Thought Content: "they're gettin' ready to kick me out of her soon and I still don't know 
where I'm going." 

Cognition: A/O X 4.

Insight: Fair

Judgment: Fair





Interventions



PRN's used: ibuprofen 400 mg, Tylenol 975 mg



Therapeutic interventions: 1:1 assessment, active listening, therapeutic conversation, 
medication administration/education/monitoring, encouragement to perform personal hygiene, 
positive reinforcement, Q15 safety checks.



Restraints/seclusion/emergency medication: N/A



Justification of Continued Inpatient Treatment: Continued therapeutic support and medication 
management needed to provide stabilization, prevent decompensation, improve coping 
mechanisms decreasing risk to patient and re-admittance. Pt can return to [facility] on 10/2/19.

## 2019-09-23 NOTE — NUR
Nursing Progress Note:



Legal hold: N/A



Client is voluntary



Report received from Tyler HORN with use of SBAR



Why are they here: Pt admitted from [facility] on 5150 for DTS. Pt has a plan to break a 
bottle to cut her wrists or overdose on pills. Pt has been living at the mission but is on a 
30 day out. She reports feeling depressed, hopeless, and helpless. Pt unable to formulate a  
safety plan at this time. She reports prior suicide attempts. 



Assessment



What has happened this shift:  When asked how if she was having SI today, she replied, 
"That's like asking a wife beater if he's still beating his wife, if he says no, he still 
used to be a wife beater." Pt continued, "Is it active? No it's not active." Asked pt if she 
could think of anything that still makes her happy or brings her mg. Pt replied that "I 
just want to go home and see my lord." Asked pt if she had any unresolved issues or any 
amends she would like to make. Pt replied that she had made amends to the people she needed 
to make amends to and she's still waiting for some of them to make amends to her. Pt states 
that "I haven't seen anything in this world yet that makes me want to go on." Pt was in day 
room watching tv and after the game was over asked if the channel could be changed. She 
became up set after barney was not changed due to other peers watching the post game show.



Pt has an appt with social security on 9/27/19. Plan is for her  to take her from 
her to the appointment. PA pushing for pt then going to a hotel with a food card. Pt able to 
return to the [facility] on 10/2/19.



S/I, H/I: Passive SI, denies HI

A/VH: Pt denies

Sleep: Slept well per noc shift report

ADL's: Independent, uses FWW for ambulation,uses pull up incontinent briefs

Group attendance: Yes

Were meds taken: Pt refused Prozac

Any med S/E: None noted or reported



Mental Status Exam



Appearance:  Hair pulled back in a head band, wearing hospital scrubs with old food stains 
on top

Eye contact: Good

Behavior: Cooperative

Speech: Soft tone, normal rate and rhythm 

Mood: Depressed, hopeless

Affect: Depressed

Thought process: Linear

Thought Content: "they're gettin' ready to kick me out of her soon and I still don't know 
where I'm going." 

Cognition: A/O X 4.

Insight: Fair

Judgment: Fair





Interventions



PRN's used: ibuprofen 400 mg, Tylenol 975 mg



Therapeutic interventions: 1:1 assessment, active listening, therapeutic conversation, 
medication administration/education/monitoring, encouragement to perform personal hygiene, 
positive reinforcement, Q15 safety checks.



Restraints/seclusion/emergency medication: N/A



Justification of Continued Inpatient Treatment: Continued therapeutic support and medication 
management needed to provide stabilization, prevent decompensation, improve coping 
mechanisms decreasing risk to patient and re-admittance. Pt can return to [facility] on 10/2/19.

## 2019-09-24 NOTE — NUR
Nursing Progress Note:



Legal hold: N/A



Client is voluntary



Report received from Diane PINEDO with use of SBAR



Why are they here: Pt admitted from [facility] on 5150 for DTS. Pt has a plan to break a 
bottle to cut her wrists or overdose on pills. Pt has been living at the mission but is on a 
30 day out. She reports feeling depressed, hopeless, and helpless. Pt unable to formulate a  
safety plan at this time. She reports prior suicide attempts. 



Assessment



What has happened this shift: Pt continues to refuse Prozac, medicated with prn Tylenol 975 
mg and ibuprofen 400 mg at 0817 for 7/10 general pain with good effect. When asked how pt 
was doing today, she replied, "I'm still here." Pt quieter today, spent more time in her 
room, sleeping more, more isolative to self. Andrea from the [facility] will be here today around 
1600 to interview pt again today. Social workers plan to speak with pt prior to interview to 
encourage her to be cooperative and polite in the hopes that pt will be able to be 
discharged there vs the [facility].



S/I, H/I: Passive SI, denies HI

A/VH: Pt denies

Sleep: Slept 6.75 hours per noc shift report

ADL's: Independent, uses FWW for ambulation,uses pull up incontinent briefs

Group attendance: Yes

Were meds taken: Pt refused Prozac

Any med S/E: None noted or reported



Mental Status Exam



Appearance:  Hair pulled back in a head band, wearing hospital scrubs

Eye contact: Good

Behavior: Cooperative, quiet, isolative to self

Speech: Soft tone, normal rate and rhythm 

Mood: Depressed

Affect: Depressed

Thought process: Linear

Thought Content: focused on pending discharge

Cognition: A/O X 4.

Insight: Fair

Judgment: Fair





Interventions



PRN's used: ibuprofen 400 mg, Tylenol 975 mg



Therapeutic interventions: 1:1 assessment, active listening, therapeutic conversation, 
medication administration/education/monitoring, encouragement to perform personal hygiene, 
positive reinforcement, Q15 safety checks.



Restraints/seclusion/emergency medication: N/A



Justification of Continued Inpatient Treatment: Pt is 74 years old and homeless, she has 
lost her SSI and is in the process of re-applying for it, she cannot return to the [facility] 
until 10/2/19, the [facility] may not be the best choice for a woman of her age, hope is that 
pt will resume SSI and be accepted at [facility] decreasing risk to patient and likely sanchez of 
re-admittance. 

-------------------------------------------------------------------------------

Addendum: 09/24/19 at 1639 by Melinda Terry RN (Lee)

-------------------------------------------------------------------------------

Interview with Andrea feranndez, pt has been accepted at [facility] plan to discharge tomorrow 
morning.

## 2019-09-24 NOTE — NUR
Nursing Progress Note:



Legal hold: N/A



Client is voluntary



Report received from Tyler HORN with use of SBAR



Why are they here: Pt admitted from [facility] on 5150 for DTS. Pt has a plan to break a 
bottle to cut her wrists or overdose on pills. Pt [facility] the mission but is on a 
30 day out. She reports feeling depressed, hopeless, and helpless. Pt unable to formulate a  
safety plan at this time. She reports prior suicide attempts. 



Assessment



What has happened this shift: Pt  medicated with prn Tylenol 975 mg and ibuprofen 400 mg at 
2130 for 7/10 general pain with good effect. When asked how pt was doing today, she replied, 
"I'm still here." Pt quieter today, spent more time in group room social with peers while 
watching tv. 



S/I, H/I: Passive SI, denies HI

A/VH: Pt denies

Sleep: Slept 6.75 hours per noc shift report

ADL's: Independent, uses FWW for ambulation,uses pull up incontinent briefs

Group attendance: Yes

Were meds taken: Pt refused Prozac

Any med S/E: None noted or reported



Mental Status Exam



Appearance:  Hair pulled back in a head band, wearing hospital scrubs

Eye contact: Good

Behavior: Cooperative, quiet, isolative to self

Speech: Soft tone, normal rate and rhythm 

Mood: Depressed

Affect: Depressed

Thought process: Linear

Thought Content: focused on pending discharge

Cognition: A/O X 4.

Insight: Fair

Judgment: Fair





Interventions



PRN's used: ibuprofen 400 mg, Tylenol 975 mg



Therapeutic interventions: 1:1 assessment, active listening, therapeutic conversation, 
medication administration/education/monitoring, encouragement to perform personal hygiene, 
positive reinforcement, Q15 safety checks.



Restraints/seclusion/emergency medication: N/A



Justification of Continued Inpatient Treatment: Pt is 74 years old and homeless, she has 
lost her SSI and is in the process of re-applying for it, she cannot return to the [facility] 
until 10/2/19, the [facility] may not be the best choice for a woman of her age, hope is that 
pt will resume SSI and be accepted at [facility] decreasing risk to patient and likely sanchez of 
re-admittance. 

-------------------------------------------------------------------------------

Addendum: 09/24/19 at 1639 by Melinda Terry RN (Lee)

-------------------------------------------------------------------------------

Interview with Andrea villagomez well, pt has been accepted at [facility] plan to discharge tomorrow 
morning.

## 2019-09-25 NOTE — NUR
DISCHARGE NOTE: JERRY Brand accompanied by PCT Dejon Velazquez RN off the unit at 1430 going to [facility] via [facility] 
transportation. She has all of her valuables with her as well as her walker. Her f/u 
appointment is tomorrow at [facility] at 8AM. She also has a follow 
up appointment on Friday with Social Security advocate. She has a disgruntled mood, 
pessimistic about the [facility] and angry abuot being homeless. She denies any SI. She has shown 
improvement in her mood since admission. She shows no acute physical or emotional distress. 
Nicotine replacement was offered but denied.

## 2020-05-27 NOTE — NUR
Nursing Progress Note:



Legal hold: N/A



Client is voluntary



Report received from Tyler HORN with use of SBAR



Why are they here: Pt admitted from [facility] on 5150 for DTS. Pt has a plan to break a 
bottle to cut her wrists or overdose on pills. [facility] but is on a 
30 day out. She reports feeling depressed, hopeless, and helpless. Pt unable to formulate a  
safety plan at this time. She reports prior suicide attempts. 



Assessment



What has happened this shift: Pt continues to be depressed with passive SI. Pt c/o pain 
before snack. Asked pt if it was her shoulders hurting again. She replied, "yeah and my 
legs, my heart, my soul, my brain. Pt was coopertive with meds and was sitting in  group 
room watching Tv with peers.



S/I, H/I: Passive SI, denies HI

A/VH: Pt denies

Sleep: Slept well per noc shift report

ADL's: Independent, uses FWW for ambulation

Group attendance: no group this morning

Were meds taken: Pt refused Prozac, only took stool softener, Tylenol and ibuprofen

Any med S/E: None noted or reported



Mental Status Exam



Appearance:  Hair pulled back in a head band, wearing hospital scrubs

Eye contact: Good

Behavior: irritable

Speech: Soft tone, normal rate and rhythm 

Mood: Depressed, hopeless, irritable

Affect: Depressed, irritable

Thought process: Linear

Thought Content: Everything hurts including her heart and soul

Cognition: A/O X 4.

Insight: Poor

Judgment: Poor





Interventions



PRN's used: ibuprofen 400 mg, Tylenol 975 mg



Therapeutic interventions: 1:1 assessment, active listening, medication 
administration/education/monitoring, encouragement to perform personal hygiene, Q15 safety 
checks.



Restraints/seclusion/emergency medication: N/A



Justification of Continued Inpatient Treatment: Continued therapeutic support and medication 
management needed to provide stabilization, prevent decompensation, improve coping 
mechanisms decreasing risk to patient and re-admittance. Patient/Caregiver provided printed discharge information.

## 2025-02-25 NOTE — NUR
Patient in room . I have received report from  JUSTINA Choi  and had the opportunity to 
ask questions and assume patient care.

-------------------------------------------------------------------------------

Addendum: 04/18/19 at 1833 by Erica Moreno RN

-------------------------------------------------------------------------------

Amended: Links added. PAST MEDICAL HISTORY:  Acute renal failure (ARF)     CAD (coronary artery disease)     Chronic atrial fibrillation     HLD (hyperlipidemia)     HTN (hypertension)     Prophylactic measure     T2DM (type 2 diabetes mellitus)